# Patient Record
Sex: MALE | Race: WHITE | NOT HISPANIC OR LATINO | Employment: FULL TIME | URBAN - METROPOLITAN AREA
[De-identification: names, ages, dates, MRNs, and addresses within clinical notes are randomized per-mention and may not be internally consistent; named-entity substitution may affect disease eponyms.]

---

## 2017-03-08 DIAGNOSIS — E11.65 TYPE 2 DIABETES MELLITUS WITH HYPERGLYCEMIA (H): ICD-10-CM

## 2017-03-08 DIAGNOSIS — K21.9 ESOPHAGEAL REFLUX: ICD-10-CM

## 2017-03-08 DIAGNOSIS — R81 GLUCOSURIA: ICD-10-CM

## 2017-03-08 NOTE — TELEPHONE ENCOUNTER
omeprazole 20 MG tablet     Last Written Prescription Date: 8/18/2016  Last Fill Quantity: 90,  # refills:0  Last Office Visit with [facility], [facility] or [facility] prescribing provider: 2/16/2016                                               sertraline (ZOLOFT) 100 MG tablet   Last Written Prescription Date: 4/7/2015  Last Fill Quantity: 180, # refills: 3  Last Office Visit with [facility] primary care provider:  2/16/2016        Last PHQ-9 score on record=   PHQ-9 SCORE 11/25/2015   Total Score -   Total Score 4             metFORMIN (GLUCOPHAGE) 500 MG tablet     Last Written Prescription Date: 11/16/2015  Last Fill Quantity: 180, # refills: 3  Last Office Visit with [facility], [facility] or [facility] prescribing provider:  2/16/2016        BP Readings from Last 3 Encounters:   12/31/15 132/89   12/24/15 119/63   11/25/15 136/88     No results found for: MICROL  No results found for: MICROALBUMIN  Creatinine   Date Value Ref Range Status   12/23/2015 0.83 0.66 - 1.25 mg/dL Final   ]  GFR Estimate   Date Value Ref Range Status   12/23/2015 >90  Non  GFR Calc   >60 mL/min/1.7m2 Final   12/22/2015 >90  Non  GFR Calc   >60 mL/min/1.7m2 Final   11/16/2015 >90  Non  GFR Calc   >60 mL/min/1.7m2 Final     GFR Estimate If Black   Date Value Ref Range Status   12/23/2015 >90   GFR Calc   >60 mL/min/1.7m2 Final   12/22/2015 >90   GFR Calc   >60 mL/min/1.7m2 Final   11/16/2015 >90   GFR Calc   >60 mL/min/1.7m2 Final     Lab Results   Component Value Date    CHOL 141 11/16/2015     Lab Results   Component Value Date    HDL 40 11/16/2015     Lab Results   Component Value Date    LDL 69 11/16/2015     Lab Results   Component Value Date    TRIG 162 11/16/2015     Lab Results   Component Value Date    CHOLHDLRATIO 3.5 11/16/2015     Lab Results   Component Value Date    AST 53 12/22/2015     Lab Results   Component Value Date    ALT 80 12/22/2015     Lab Results    Component Value Date    A1C 7.0 12/22/2015    A1C 7.2 11/16/2015    A1C 15.0 04/07/2015     Potassium   Date Value Ref Range Status   12/23/2015 4.1 3.4 - 5.3 mmol/L Final

## 2017-03-08 NOTE — TELEPHONE ENCOUNTER
insulin glargine (LANTUS) 100 UNIT/ML PEN       Last Written Prescription Date: 11/16/2015  Last Fill Quantity: 1, # refills: 11  Last Office Visit with [facility] or [facility] prescribing provider:  2/16/2016        BP Readings from Last 3 Encounters:   12/31/15 132/89   12/24/15 119/63   11/25/15 136/88     No results found for: MICROL  No results found for: MICROALBUMIN  Creatinine   Date Value Ref Range Status   12/23/2015 0.83 0.66 - 1.25 mg/dL Final   ]  GFR Estimate   Date Value Ref Range Status   12/23/2015 >90  Non  GFR Calc   >60 mL/min/1.7m2 Final   12/22/2015 >90  Non  GFR Calc   >60 mL/min/1.7m2 Final   11/16/2015 >90  Non  GFR Calc   >60 mL/min/1.7m2 Final     GFR Estimate If Black   Date Value Ref Range Status   12/23/2015 >90   GFR Calc   >60 mL/min/1.7m2 Final   12/22/2015 >90   GFR Calc   >60 mL/min/1.7m2 Final   11/16/2015 >90   GFR Calc   >60 mL/min/1.7m2 Final     Lab Results   Component Value Date    CHOL 141 11/16/2015     Lab Results   Component Value Date    HDL 40 11/16/2015     Lab Results   Component Value Date    LDL 69 11/16/2015     Lab Results   Component Value Date    TRIG 162 11/16/2015     Lab Results   Component Value Date    CHOLHDLRATIO 3.5 11/16/2015     Lab Results   Component Value Date    AST 53 12/22/2015     Lab Results   Component Value Date    ALT 80 12/22/2015     Lab Results   Component Value Date    A1C 7.0 12/22/2015    A1C 7.2 11/16/2015    A1C 15.0 04/07/2015     Potassium   Date Value Ref Range Status   12/23/2015 4.1 3.4 - 5.3 mmol/L Final

## 2017-03-10 RX ORDER — NICOTINE POLACRILEX 4 MG/1
20 GUM, CHEWING ORAL DAILY
Qty: 90 TABLET | Refills: 0 | Status: CANCELLED | OUTPATIENT
Start: 2017-03-10

## 2017-03-10 RX ORDER — SERTRALINE HYDROCHLORIDE 100 MG/1
200 TABLET, FILM COATED ORAL DAILY
Qty: 180 TABLET | Refills: 3 | Status: CANCELLED | OUTPATIENT
Start: 2017-03-10

## 2017-03-10 NOTE — TELEPHONE ENCOUNTER
Mail box is full.   Unable to LM or reach pt.  Spoke with Pharmacist and he will inform pt he needs an appt.  Pt has Insurance now and will schedule.  Mimi

## 2017-06-02 ENCOUNTER — TELEPHONE (OUTPATIENT)
Dept: FAMILY MEDICINE | Facility: CLINIC | Age: 37
End: 2017-06-02

## 2017-06-02 DIAGNOSIS — E11.9 TYPE 2 DIABETES MELLITUS WITHOUT COMPLICATION, WITHOUT LONG-TERM CURRENT USE OF INSULIN (H): Primary | ICD-10-CM

## 2017-06-02 RX ORDER — INSULIN GLARGINE 100 [IU]/ML
65 INJECTION, SOLUTION SUBCUTANEOUS DAILY
Qty: 30 ML | Refills: 1 | Status: CANCELLED | OUTPATIENT
Start: 2017-06-02

## 2017-06-02 NOTE — TELEPHONE ENCOUNTER
He has not been into this clinic for more than 18 months. He needs to have a clinic visit to get his medications reconciled or else whichever physician is managing his diabetes needs to handle this order.    Solomon

## 2017-06-02 NOTE — TELEPHONE ENCOUNTER
Per Rosy Celeste, Insurance prefers Basaglar for Audit Purposes, please change Lantus order to Basaglar

## 2017-06-08 ENCOUNTER — TELEPHONE (OUTPATIENT)
Dept: FAMILY MEDICINE | Facility: CLINIC | Age: 37
End: 2017-06-08

## 2017-06-08 DIAGNOSIS — E11.65 TYPE 2 DIABETES MELLITUS WITH HYPERGLYCEMIA, WITHOUT LONG-TERM CURRENT USE OF INSULIN (H): ICD-10-CM

## 2017-06-08 NOTE — TELEPHONE ENCOUNTER
Reason for Call:  Other prescription    Detailed comments: Per fax from [facility] drug:  Pt's insurance wants to use Basaglar - They are asking for a new Rx for this med.        Call taken on 6/8/2017 at 10:59 AM by Beata Jimenez

## 2017-06-09 RX ORDER — INSULIN GLARGINE 100 [IU]/ML
65 INJECTION, SOLUTION SUBCUTANEOUS 2 TIMES DAILY
Qty: 3 ML | Refills: 11 | Status: SHIPPED | OUTPATIENT
Start: 2017-06-09 | End: 2017-06-12

## 2017-06-12 ENCOUNTER — OFFICE VISIT (OUTPATIENT)
Dept: FAMILY MEDICINE | Facility: CLINIC | Age: 37
End: 2017-06-12
Payer: COMMERCIAL

## 2017-06-12 VITALS
DIASTOLIC BLOOD PRESSURE: 91 MMHG | HEART RATE: 107 BPM | SYSTOLIC BLOOD PRESSURE: 131 MMHG | TEMPERATURE: 97.2 F | BODY MASS INDEX: 39.17 KG/M2 | WEIGHT: 315 LBS | HEIGHT: 75 IN

## 2017-06-12 DIAGNOSIS — E11.65 TYPE 2 DIABETES MELLITUS WITH HYPERGLYCEMIA, WITHOUT LONG-TERM CURRENT USE OF INSULIN (H): ICD-10-CM

## 2017-06-12 DIAGNOSIS — R81 GLUCOSURIA: ICD-10-CM

## 2017-06-12 DIAGNOSIS — K21.9 GASTROESOPHAGEAL REFLUX DISEASE, ESOPHAGITIS PRESENCE NOT SPECIFIED: ICD-10-CM

## 2017-06-12 LAB
ANION GAP SERPL CALCULATED.3IONS-SCNC: 8 MMOL/L (ref 3–14)
BUN SERPL-MCNC: 17 MG/DL (ref 7–30)
CALCIUM SERPL-MCNC: 9.4 MG/DL (ref 8.5–10.1)
CHLORIDE SERPL-SCNC: 103 MMOL/L (ref 94–109)
CO2 SERPL-SCNC: 23 MMOL/L (ref 20–32)
CREAT SERPL-MCNC: 0.78 MG/DL (ref 0.66–1.25)
GFR SERPL CREATININE-BSD FRML MDRD: ABNORMAL ML/MIN/1.7M2
GLUCOSE SERPL-MCNC: 273 MG/DL (ref 70–99)
HBA1C MFR BLD: 11.7 % (ref 4.3–6)
POTASSIUM SERPL-SCNC: 3.8 MMOL/L (ref 3.4–5.3)
SODIUM SERPL-SCNC: 134 MMOL/L (ref 133–144)

## 2017-06-12 PROCEDURE — 80048 BASIC METABOLIC PNL TOTAL CA: CPT | Performed by: FAMILY MEDICINE

## 2017-06-12 PROCEDURE — 83036 HEMOGLOBIN GLYCOSYLATED A1C: CPT | Performed by: FAMILY MEDICINE

## 2017-06-12 PROCEDURE — 36415 COLL VENOUS BLD VENIPUNCTURE: CPT | Performed by: FAMILY MEDICINE

## 2017-06-12 PROCEDURE — 99214 OFFICE O/P EST MOD 30 MIN: CPT | Performed by: FAMILY MEDICINE

## 2017-06-12 RX ORDER — PANTOPRAZOLE SODIUM 40 MG/1
40 TABLET, DELAYED RELEASE ORAL DAILY
Qty: 90 TABLET | Refills: 3 | Status: SHIPPED | OUTPATIENT
Start: 2017-06-12 | End: 2018-06-27

## 2017-06-12 RX ORDER — INSULIN GLARGINE 100 [IU]/ML
65 INJECTION, SOLUTION SUBCUTANEOUS 2 TIMES DAILY
Qty: 3 ML | Refills: 11 | Status: SHIPPED | OUTPATIENT
Start: 2017-06-12 | End: 2018-06-18

## 2017-06-12 ASSESSMENT — ANXIETY QUESTIONNAIRES
GAD7 TOTAL SCORE: 12
IF YOU CHECKED OFF ANY PROBLEMS ON THIS QUESTIONNAIRE, HOW DIFFICULT HAVE THESE PROBLEMS MADE IT FOR YOU TO DO YOUR WORK, TAKE CARE OF THINGS AT HOME, OR GET ALONG WITH OTHER PEOPLE: SOMEWHAT DIFFICULT
2. NOT BEING ABLE TO STOP OR CONTROL WORRYING: MORE THAN HALF THE DAYS
7. FEELING AFRAID AS IF SOMETHING AWFUL MIGHT HAPPEN: NOT AT ALL
1. FEELING NERVOUS, ANXIOUS, OR ON EDGE: MORE THAN HALF THE DAYS
3. WORRYING TOO MUCH ABOUT DIFFERENT THINGS: MORE THAN HALF THE DAYS
5. BEING SO RESTLESS THAT IT IS HARD TO SIT STILL: MORE THAN HALF THE DAYS
6. BECOMING EASILY ANNOYED OR IRRITABLE: MORE THAN HALF THE DAYS

## 2017-06-12 ASSESSMENT — PATIENT HEALTH QUESTIONNAIRE - PHQ9: 5. POOR APPETITE OR OVEREATING: MORE THAN HALF THE DAYS

## 2017-06-12 NOTE — PROGRESS NOTES
SUBJECTIVE:                                                    Ghanshyam Suggs is a 37 year old male who presents to clinic today for the following health issues:      Diabetes Follow-up      Patient is checking blood sugars: 1 time a week    Diabetic concerns: None and blood sugar frequently over 200     Symptoms of hypoglycemia (low blood sugar): dizzy, weak     Paresthesias (numbness or burning in feet) or sores: No     Date of last diabetic eye exam: 9 months ago       Amount of exercise or physical activity: physical job    Problems taking medications regularly: No    Medication side effects: no    Diet: diabetic          Problem list and histories reviewed & adjusted, as indicated.  Additional history:         Reviewed and updated as needed this visit by clinical staff  Allergies       Reviewed and updated as needed this visit by Provider      Further history obtained, clarified or corrected by physician:  His diabetes is uncontrolled because he has been off medication for months and now he has new insurance so he wants to get back on. He still has reflux regularly and it does not seem well controlled so he wants to try different medication.    OBJECTIVE:  LUNGS: clear to auscultation, normal breath sounds  CV: RRR without murmur  ABD: BS+, soft, nontender, no masses, no hepatosplenomegaly  EXTREMITIES: without joint tenderness, swelling or erythema.  No muscle tenderness or abnormality.  SKIN: No rashes or abnormalities  NEURO:non focal exam    ASSESSMENT:     Glucosuria  Gastroesophageal reflux disease, esophagitis presence not specified  Type 2 diabetes mellitus with hyperglycemia, without long-term current use of insulin (H)    PLAN:  Restart his diabetes medications  Diabetic educator referral  Switch to Protonix  Recheck in 3 months    Orders Placed This Encounter     Hemoglobin A1c     Basic metabolic panel     DIABETES EDUCATOR REFERRAL     metFORMIN (GLUCOPHAGE) 500 MG tablet     pantoprazole  (PROTONIX) 40 MG EC tablet     insulin glargine (BASAGLAR KWIKPEN) 100 UNIT/ML injection

## 2017-06-12 NOTE — MR AVS SNAPSHOT
After Visit Summary   6/12/2017    Ghanshyam Suggs    MRN: [id number]           Patient Information     Date Of Birth          1980        Visit Information        Provider Department      6/12/2017 2:40 PM Baltazar Carbajal MD [facility] [place]        Today's Diagnoses     Glucosuria        Gastroesophageal reflux disease, esophagitis presence not specified        Type 2 diabetes mellitus with hyperglycemia, without long-term current use of insulin (H)          Care Instructions    Thank you for choosing [facility].  You may be receiving a survey in the mail from [facility] regarding your visit today.  Please take a few minutes to complete and return the survey to let us know how we are doing.  Our Clinic hours are:  Mondays    7:20 am - 7 pm  Tues -  Fri  7:20 am - 5 pm  Clinic Phone: [phone]  The clinic lab opens at 7:30 am Mon - Fri and appointments are required.  [facility]. [phone]  Monday-Thursday 8 am - 7pm  Tues/Wed/Fri 8 am - 5:30 pm            Follow-ups after your visit        Additional Services     DIABETES EDUCATOR REFERRAL       DIABETES SELF MANAGEMENT TRAINING (DSMT)      Your provider has referred you to Diabetes Education: [facility]: Diabetes Education - All [facility] [phone]   [email or web address]    Type of training and number of hours: Previous Diagnosis: Follow-up DSMT - 2 hours.    Medicare covers: 10 hours of initial DSMT in 12 month period from the time of first visit, plus 2 hours of follow-up DSMT annually, and additional hours as requested for insulin training.    Diabetes Type: Type 2 - On Insulin             Diabetes Co-Morbidities:                A1C Goal:         A1C is: Lab Results       Component                Value               Date                       A1C                      7.0                 12/22/2015              If an urgent visit is needed or  A1C is above 12, Care Team to call the Diabetes Education Team at [phone] or send an In Basket message to the Diabetes Education Pool (P DIAB ED-PATIENT CARE).    Diabetes Education Topics:     Special Educational Needs Requiring Individual DSMT:        MEDICAL NUTRITION THERAPY (MNT) for Diabetes    Medical Nutrition Therapy with a Registered Dietitian can be provided in coordination with Diabetes Self-Management Training to assist in achieving optimal diabetes management.    MNT Type and Hours:                        Medicare will cover: 3 hours initial MNT in 12 month period after first visit, plus 2 hours of follow-up MNT annually    Please be aware that coverage of these services is subject to the terms and limitations of your health insurance plan.  Call member services at your health plan to determine Diabetes Self-Management Training benefits and ask which blood glucose monitor brands are covered by your plan.      Please bring the following with you to your appointment:    (1)  List of current medications   (2)  List of Blood Glucose Monitor brands that are covered by your insurance plan  (3)  Blood Glucose Monitor and log book  (4)   Food records for the 3 days prior to your visit    The Certified Diabetes Educator may make diabetes medication adjustments per the CDE Protocol and Collaborative Practice Agreement.                  Your next 10 appointments already scheduled     Jun 21, 2017  1:00 PM CDT   Diabetic Education with CL DIABETIC ED RESOURCE   [facility][note 3] ([facility][note 3])    [address]  [facility] [address] [phone]              Who to contact     If you have questions or need follow up information about today's clinic visit or your schedule please contact [facility] directly at [phone].  Normal or non-critical lab and imaging results will be communicated to you by MyChart, letter or phone within 4 business days  "after the clinic has received the results. If you do not hear from us within 7 days, please contact the clinic through [facility] or phone. If you have a critical or abnormal lab result, we will notify you by phone as soon as possible.  Submit refill requests through [employer] [facility] or call your pharmacy and they will forward the refill request to us. Please allow 3 business days for your refill to be completed.          Additional Information About Your Visit        [employer] [facility] Information     [facility] lets you send messages to your doctor, view your test results, renew your prescriptions, schedule appointments and more. To sign up, go to [email or web address] . Click on \"Log in\" on the left side of the screen, which will take you to the Welcome page. Then click on \"Sign up Now\" on the right side of the page.     You will be asked to enter the access code listed below, as well as some personal information. Please follow the directions to create your username and password.     Your access code is: [email or web address]  Expires: 9/10/2017  3:46 PM     Your access code will  in 90 days. If you need help or a new code, please call your [place] clinic or [phone].        Care EveryWhere ID     This is your Care EveryWhere ID. This could be used by other organizations to access your [place] medical records  [id number]        Your Vitals Were     Pulse Temperature Height BMI (Body Mass Index)          107 97.2  F (36.2  C) (Tympanic) 6' 2.5\" (1.892 m) 42.39 kg/m2         Blood Pressure from Last 3 Encounters:   17 (!) 131/91   12/31/15 132/89   12/24/15 119/63    Weight from Last 3 Encounters:   17 (!) 334 lb 9.6 oz (151.8 kg)   12/31/15 (!) 400 lb (181.4 kg)   12/22/15 (!) 428 lb 12.7 oz (194.5 kg)              We Performed the Following     Basic metabolic panel     DIABETES EDUCATOR REFERRAL     Hemoglobin A1c          Today's Medication Changes          These changes are accurate as of: 17  4:11 PM.  If you " have any questions, ask your nurse or doctor.               Start taking these medicines.        Dose/Directions    pantoprazole 40 MG EC tablet   Commonly known as:  PROTONIX   Used for:  Gastroesophageal reflux disease, esophagitis presence not specified   Replaces:  omeprazole 20 MG tablet        Dose:  40 mg   Take 1 tablet (40 mg) by mouth daily Take 30-60 minutes before a meal.   Quantity:  90 tablet   Refills:  3         Stop taking these medicines if you haven't already. Please contact your care team if you have questions.     omeprazole 20 MG tablet   Replaced by:  pantoprazole 40 MG EC tablet                Where to get your medicines      These medications were sent to [address]  [address] [facility] [address]     Phone:  [phone]     insulin glargine 100 UNIT/ML injection    metFORMIN 500 MG tablet    pantoprazole 40 MG EC tablet                Primary Care Provider Office Phone # Fax #    Baltazar Carbajal [phone]       [facility] [address] [facility] [address]        Thank you!     Thank you for choosing [facility]  for your care. Our goal is always to provide you with excellent care. Hearing back from our patients is one way we can continue to improve our services. Please take a few minutes to complete the written survey that you may receive in the mail after your visit with us. Thank you!             Your Updated Medication List - Protect others around you: Learn how to safely use, store and throw away your medicines at www.disposemymeds.org.          This list is accurate as of: 6/12/17  4:11 PM.  Always use your most recent med list.                   Brand Name Dispense Instructions for use    BD ULTRA FINE PEN NEEDLES     1 Box    1 device daily or as directed       blood glucose monitoring lancets     1 Box    Use to test blood sugars 4 times daily or as directed.       blood glucose  monitoring test strip    no brand specified    1 Box    Use to test blood sugar 4 times daily or as directed.       cephALEXin 500 MG capsule    KEFLEX    30 capsule    Take 1 capsule (500 mg) by mouth 3 times daily       * insulin glargine 100 UNIT/ML injection    LANTUS    60 mL    65 units daily (or as directed by providers) in the morning.       * insulin glargine 100 UNIT/ML injection     3 mL    Inject 65 Units Subcutaneous 2 times daily       meclizine 25 MG tablet    ANTIVERT    20 tablet    Take 1 tablet (25 mg) by mouth 3 times daily as needed for dizziness       metFORMIN 500 MG tablet    GLUCOPHAGE    180 tablet    Take 1 tablet (500 mg) by mouth 2 times daily (with meals)       naproxen 500 MG tablet    NAPROSYN    20 tablet    Take 1 tablet (500 mg) by mouth 2 times daily as needed for moderate pain       order for DME      Auto-CPAP: Max 15 cm H2O Min 5 cm H2O  Continuous  Lifetime need and heated humidity.       * oxyCODONE 5 MG IR tablet    ROXICODONE    1 tablet    Take 15 mg by mouth 2 times daily as needed for pain (up to 6 tabs per day as needed)       * oxyCODONE 5 MG IR tablet    ROXICODONE    30 tablet    Take 1 tablet (5 mg) by mouth every 4 hours as needed for moderate to severe pain       pantoprazole 40 MG EC tablet    PROTONIX    90 tablet    Take 1 tablet (40 mg) by mouth daily Take 30-60 minutes before a meal.       sertraline 100 MG tablet    ZOLOFT    180 tablet    Take 2 tablets (200 mg) by mouth daily       XANAX PO      Take 0.25 mg by mouth Takes 4 tabs as needed.       * Notice:  This list has 4 medication(s) that are the same as other medications prescribed for you. Read the directions carefully, and ask your doctor or other care provider to review them with you.

## 2017-06-12 NOTE — PATIENT INSTRUCTIONS
Thank you for choosing [facility].  You may be receiving a survey in the mail from [facility] regarding your visit today.  Please take a few minutes to complete and return the survey to let us know how we are doing.  Our Clinic hours are:  Mondays    7:20 am - 7 pm  Tues -  Fri  7:20 am - 5 pm  Clinic Phone: [phone]  The clinic lab opens at 7:30 am Mon - Fri and appointments are required.  [facility]. [phone]  Monday-Thursday 8 am - 7pm  Tues/Wed/Fri 8 am - 5:30 pm

## 2017-06-12 NOTE — NURSING NOTE
"Chief Complaint   Patient presents with     Diabetes       Initial BP (!) 131/91 (BP Location: Left arm, Patient Position: Chair, Cuff Size: Adult Large)  Pulse 107  Temp 97.2  F (36.2  C) (Tympanic)  Ht 6' 2.5\" (1.892 m)  Wt (!) 334 lb 9.6 oz (151.8 kg)  BMI 42.39 kg/m2 Estimated body mass index is 42.39 kg/(m^2) as calculated from the following:    Height as of this encounter: 6' 2.5\" (1.892 m).    Weight as of this encounter: 334 lb 9.6 oz (151.8 kg).  Medication Reconciliation: complete    "

## 2017-06-13 ASSESSMENT — ANXIETY QUESTIONNAIRES: GAD7 TOTAL SCORE: 12

## 2017-06-13 ASSESSMENT — PATIENT HEALTH QUESTIONNAIRE - PHQ9: SUM OF ALL RESPONSES TO PHQ QUESTIONS 1-9: 10

## 2017-06-14 DIAGNOSIS — E11.9 TYPE 2 DIABETES MELLITUS WITH TARGET HEMOGLOBIN A1C OF LESS THAN 8.0 PERCENT (H): ICD-10-CM

## 2017-06-14 DIAGNOSIS — E11.65 TYPE 2 DIABETES MELLITUS WITH HYPERGLYCEMIA (H): ICD-10-CM

## 2017-06-14 NOTE — TELEPHONE ENCOUNTER
Test strips      Last Written Prescription Date: 01/21/2016  Last Fill Quantity: 1,  # refills: 3   Last Office Visit with [facility] or [facility] prescribing provider: 06/12/2017    Pen needles      Last Written Prescription Date: 06/25/2015  Last Fill Quantity: 1,  # refills: 3   Last Office Visit with [facility] or [facility] prescribing provider: 06/12/2017                                               Gavin SHERIFF)

## 2017-06-21 ENCOUNTER — TELEPHONE (OUTPATIENT)
Dept: EDUCATION SERVICES | Facility: CLINIC | Age: 37
End: 2017-06-21

## 2017-06-21 DIAGNOSIS — Z53.9 NO SHOW: Primary | ICD-10-CM

## 2017-07-27 ENCOUNTER — TRANSFERRED RECORDS (OUTPATIENT)
Dept: HEALTH INFORMATION MANAGEMENT | Facility: CLINIC | Age: 37
End: 2017-07-27

## 2017-10-10 ENCOUNTER — TRANSFERRED RECORDS (OUTPATIENT)
Dept: HEALTH INFORMATION MANAGEMENT | Facility: CLINIC | Age: 37
End: 2017-10-10

## 2017-10-24 ENCOUNTER — APPOINTMENT (OUTPATIENT)
Dept: CT IMAGING | Facility: CLINIC | Age: 37
End: 2017-10-24
Attending: FAMILY MEDICINE
Payer: COMMERCIAL

## 2017-10-24 ENCOUNTER — HOSPITAL ENCOUNTER (EMERGENCY)
Facility: CLINIC | Age: 37
Discharge: HOME | End: 2017-10-24
Attending: FAMILY MEDICINE | Admitting: FAMILY MEDICINE
Payer: COMMERCIAL

## 2017-10-24 VITALS
TEMPERATURE: 97.9 F | OXYGEN SATURATION: 93 % | RESPIRATION RATE: 20 BRPM | DIASTOLIC BLOOD PRESSURE: 88 MMHG | SYSTOLIC BLOOD PRESSURE: 130 MMHG

## 2017-10-24 DIAGNOSIS — K57.32 DIVERTICULITIS OF COLON: ICD-10-CM

## 2017-10-24 LAB
ALBUMIN SERPL-MCNC: 3.6 G/DL (ref 3.4–5)
ALBUMIN UR-MCNC: NEGATIVE MG/DL
ALP SERPL-CCNC: 97 U/L (ref 40–150)
ALT SERPL W P-5'-P-CCNC: 29 U/L (ref 0–70)
ANION GAP SERPL CALCULATED.3IONS-SCNC: 7 MMOL/L (ref 3–14)
APPEARANCE UR: CLEAR
AST SERPL W P-5'-P-CCNC: 20 U/L (ref 0–45)
BASOPHILS # BLD AUTO: 0 10E9/L (ref 0–0.2)
BASOPHILS NFR BLD AUTO: 0.3 %
BILIRUB SERPL-MCNC: 0.3 MG/DL (ref 0.2–1.3)
BILIRUB UR QL STRIP: NEGATIVE
BUN SERPL-MCNC: 11 MG/DL (ref 7–30)
CALCIUM SERPL-MCNC: 8.8 MG/DL (ref 8.5–10.1)
CHLORIDE SERPL-SCNC: 109 MMOL/L (ref 94–109)
CO2 SERPL-SCNC: 23 MMOL/L (ref 20–32)
COLOR UR AUTO: YELLOW
CREAT SERPL-MCNC: 0.89 MG/DL (ref 0.66–1.25)
DIFFERENTIAL METHOD BLD: NORMAL
EOSINOPHIL # BLD AUTO: 0.2 10E9/L (ref 0–0.7)
EOSINOPHIL NFR BLD AUTO: 2.1 %
ERYTHROCYTE [DISTWIDTH] IN BLOOD BY AUTOMATED COUNT: 13.3 % (ref 10–15)
GFR SERPL CREATININE-BSD FRML MDRD: >90 ML/MIN/1.7M2
GLUCOSE SERPL-MCNC: 121 MG/DL (ref 70–99)
GLUCOSE UR STRIP-MCNC: NEGATIVE MG/DL
HCT VFR BLD AUTO: 44.2 % (ref 40–53)
HGB BLD-MCNC: 16.1 G/DL (ref 13.3–17.7)
HGB UR QL STRIP: NEGATIVE
IMM GRANULOCYTES # BLD: 0 10E9/L (ref 0–0.4)
IMM GRANULOCYTES NFR BLD: 0.3 %
KETONES UR STRIP-MCNC: NEGATIVE MG/DL
LEUKOCYTE ESTERASE UR QL STRIP: NEGATIVE
LIPASE SERPL-CCNC: 76 U/L (ref 73–393)
LYMPHOCYTES # BLD AUTO: 2.8 10E9/L (ref 0.8–5.3)
LYMPHOCYTES NFR BLD AUTO: 30.2 %
MCH RBC QN AUTO: 29.5 PG (ref 26.5–33)
MCHC RBC AUTO-ENTMCNC: 36.4 G/DL (ref 31.5–36.5)
MCV RBC AUTO: 81 FL (ref 78–100)
MONOCYTES # BLD AUTO: 0.5 10E9/L (ref 0–1.3)
MONOCYTES NFR BLD AUTO: 5.8 %
NEUTROPHILS # BLD AUTO: 5.7 10E9/L (ref 1.6–8.3)
NEUTROPHILS NFR BLD AUTO: 61.3 %
NITRATE UR QL: NEGATIVE
PH UR STRIP: 5.5 PH (ref 5–7)
PLATELET # BLD AUTO: 207 10E9/L (ref 150–450)
POTASSIUM SERPL-SCNC: 3.5 MMOL/L (ref 3.4–5.3)
PROT SERPL-MCNC: 7.2 G/DL (ref 6.8–8.8)
RBC # BLD AUTO: 5.45 10E12/L (ref 4.4–5.9)
SODIUM SERPL-SCNC: 139 MMOL/L (ref 133–144)
SOURCE: NORMAL
SP GR UR STRIP: 1.02 (ref 1–1.03)
UROBILINOGEN UR STRIP-MCNC: 2 MG/DL (ref 0–2)
WBC # BLD AUTO: 9.3 10E9/L (ref 4–11)

## 2017-10-24 PROCEDURE — 74177 CT ABD & PELVIS W/CONTRAST: CPT

## 2017-10-24 PROCEDURE — 81003 URINALYSIS AUTO W/O SCOPE: CPT | Performed by: FAMILY MEDICINE

## 2017-10-24 PROCEDURE — 83690 ASSAY OF LIPASE: CPT | Performed by: FAMILY MEDICINE

## 2017-10-24 PROCEDURE — 80053 COMPREHEN METABOLIC PANEL: CPT | Performed by: FAMILY MEDICINE

## 2017-10-24 PROCEDURE — 25000128 H RX IP 250 OP 636: Performed by: FAMILY MEDICINE

## 2017-10-24 PROCEDURE — 96376 TX/PRO/DX INJ SAME DRUG ADON: CPT | Performed by: FAMILY MEDICINE

## 2017-10-24 PROCEDURE — 96374 THER/PROPH/DIAG INJ IV PUSH: CPT | Mod: 59 | Performed by: FAMILY MEDICINE

## 2017-10-24 PROCEDURE — 96361 HYDRATE IV INFUSION ADD-ON: CPT | Performed by: FAMILY MEDICINE

## 2017-10-24 PROCEDURE — 96375 TX/PRO/DX INJ NEW DRUG ADDON: CPT | Performed by: FAMILY MEDICINE

## 2017-10-24 PROCEDURE — 99285 EMERGENCY DEPT VISIT HI MDM: CPT | Mod: Z6 | Performed by: FAMILY MEDICINE

## 2017-10-24 PROCEDURE — 85025 COMPLETE CBC W/AUTO DIFF WBC: CPT | Performed by: FAMILY MEDICINE

## 2017-10-24 PROCEDURE — 99285 EMERGENCY DEPT VISIT HI MDM: CPT | Mod: 25 | Performed by: FAMILY MEDICINE

## 2017-10-24 RX ORDER — KETOROLAC TROMETHAMINE 30 MG/ML
15 INJECTION, SOLUTION INTRAMUSCULAR; INTRAVENOUS ONCE
Status: COMPLETED | OUTPATIENT
Start: 2017-10-24 | End: 2017-10-24

## 2017-10-24 RX ORDER — HYDROMORPHONE HYDROCHLORIDE 1 MG/ML
0.5 INJECTION, SOLUTION INTRAMUSCULAR; INTRAVENOUS; SUBCUTANEOUS
Status: DISCONTINUED | OUTPATIENT
Start: 2017-10-24 | End: 2017-10-24 | Stop reason: HOSPADM

## 2017-10-24 RX ORDER — ONDANSETRON 2 MG/ML
4 INJECTION INTRAMUSCULAR; INTRAVENOUS
Status: DISCONTINUED | OUTPATIENT
Start: 2017-10-24 | End: 2017-10-24 | Stop reason: HOSPADM

## 2017-10-24 RX ORDER — IOPAMIDOL 755 MG/ML
100 INJECTION, SOLUTION INTRAVASCULAR ONCE
Status: COMPLETED | OUTPATIENT
Start: 2017-10-24 | End: 2017-10-24

## 2017-10-24 RX ORDER — METRONIDAZOLE 500 MG/1
500 TABLET ORAL 3 TIMES DAILY
Qty: 21 TABLET | Refills: 0 | Status: SHIPPED | OUTPATIENT
Start: 2017-10-24 | End: 2017-10-31

## 2017-10-24 RX ORDER — HYDROMORPHONE HYDROCHLORIDE 2 MG/1
2-4 TABLET ORAL EVERY 6 HOURS PRN
Qty: 20 TABLET | Refills: 0 | Status: SHIPPED | OUTPATIENT
Start: 2017-10-24 | End: 2018-01-03

## 2017-10-24 RX ORDER — CIPROFLOXACIN 500 MG/1
500 TABLET, FILM COATED ORAL 2 TIMES DAILY
Qty: 14 TABLET | Refills: 0 | Status: SHIPPED | OUTPATIENT
Start: 2017-10-24 | End: 2017-10-31

## 2017-10-24 RX ADMIN — HYDROMORPHONE HYDROCHLORIDE 0.5 MG: 1 INJECTION, SOLUTION INTRAMUSCULAR; INTRAVENOUS; SUBCUTANEOUS at 15:51

## 2017-10-24 RX ADMIN — SODIUM CHLORIDE 1000 ML: 9 INJECTION, SOLUTION INTRAVENOUS at 15:49

## 2017-10-24 RX ADMIN — HYDROMORPHONE HYDROCHLORIDE 0.5 MG: 1 INJECTION, SOLUTION INTRAMUSCULAR; INTRAVENOUS; SUBCUTANEOUS at 17:20

## 2017-10-24 RX ADMIN — KETOROLAC TROMETHAMINE 15 MG: 30 INJECTION, SOLUTION INTRAMUSCULAR at 15:50

## 2017-10-24 RX ADMIN — IOPAMIDOL 100 ML: 755 INJECTION, SOLUTION INTRAVENOUS at 17:02

## 2017-10-24 RX ADMIN — ONDANSETRON 4 MG: 2 INJECTION INTRAMUSCULAR; INTRAVENOUS at 15:49

## 2017-10-24 NOTE — ED AVS SNAPSHOT
[facility]    [address] [facility] [address]    Phone:  [phone]    Fax:  [phone]                                       Ghanshyam Suggs   MRN: [id number]    Department:  [facility]   Date of Visit:  10/24/2017           After Visit Summary Signature Page     I have received my discharge instructions, and my questions have been answered. I have discussed any challenges I see with this plan with the nurse or doctor.    ..........................................................................................................................................  Patient/Patient Representative Signature      ..........................................................................................................................................  Patient Representative Print Name and Relationship to Patient    ..................................................               ................................................  Date                                            Time    ..........................................................................................................................................  Reviewed by Signature/Title    ...................................................              ..............................................  Date                                                            Time

## 2017-10-24 NOTE — ED NOTES
Pt reports mid lower abdominal pain for a couple of weeks with pain in testicle and into rectum that is consistent 7/10 and increases to 10/10.   Increased pain with coughing. Denies vomiting, difficulty with urination, rectal bleeding.

## 2017-10-24 NOTE — ED AVS SNAPSHOT
[facility]    [address] [facility] [address] [facility] [address]    Phone:  [phone]    Fax:  [phone]                                       Ghanshyam Suggs   MRN: [id number]    Department:  [facility]   Date of Visit:  10/24/2017           Patient Information     Date Of Birth          1980        Your diagnoses for this visit were:     Diverticulitis of colon        You were seen by Axel Fleming MD.        Discharge Instructions       Return to the Emergency Room if the following occurs:     Worsened pain, vomiting/dehydration, or for any concern at anytime.    Or, follow-up with the following provider as we discussed:     Return to your primary doctor next week for reevaluation.    Medications discussed:    Ibuprofen 600 mg every six hours for pain (7 days duration).  Tylenol 1000 mg every six hours for pain (7 days duration).  Therefore, you can alternate these every three hours and do it safely.  Dilaudid 2-4 mg every six hours as needed for pain not relieved by the above.  Ciprofloxacin.  Flagyl.  Probiotic daily.  Continue for at least a week beyond completion of the antibiotic.    If you received pain-relieving or sedating medication during your time in the ER, avoid alcohol, driving automobiles, or working with machinery.  Also, a responsible adult must stay with you.      If you had X-rays or labs done we will attempt to contact you if there is a change needed in your care.      Call the Nurse Advice Line at [phone] or [phone] for any concern at anytime.      24 Hour Appointment Hotline       To make an appointment at any [facility], call [phone] ([phone]). If you don't have a family doctor or clinic, we will help you find one. [facility] are conveniently located to serve the needs of you and your family.             Review of your medicines      START taking        Dose / Directions Last dose taken     ciprofloxacin 500 MG tablet   Commonly known as:  CIPRO   Dose:  500 mg   Quantity:  14 tablet        Take 1 tablet (500 mg) by mouth 2 times daily for 7 days   Refills:  0        HYDROmorphone 2 MG tablet   Commonly known as:  DILAUDID   Dose:  2-4 mg   Quantity:  20 tablet        Take 1-2 tablets (2-4 mg) by mouth every 6 hours as needed for moderate to severe pain   Refills:  0        metroNIDAZOLE 500 MG tablet   Commonly known as:  FLAGYL   Dose:  500 mg   Quantity:  21 tablet        Take 1 tablet (500 mg) by mouth 3 times daily for 7 days   Refills:  0          Our records show that you are taking the medicines listed below. If these are incorrect, please call your family doctor or clinic.        Dose / Directions Last dose taken    BD ULTRA FINE PEN NEEDLES   Quantity:  1 Box        1 device daily   Refills:  3        blood glucose monitoring lancets   Quantity:  1 Box        Use to test blood sugars 4 times daily or as directed.   Refills:  prn        blood glucose monitoring test strip   Commonly known as:  no brand specified   Quantity:  4 Box        Use to test blood sugar 4 times daily   Refills:  3        cephALEXin 500 MG capsule   Commonly known as:  KEFLEX   Dose:  500 mg   Quantity:  30 capsule        Take 1 capsule (500 mg) by mouth 3 times daily   Refills:  0        * insulin glargine 100 UNIT/ML injection   Commonly known as:  LANTUS   Quantity:  60 mL        65 units daily (or as directed by providers) in the morning.   Refills:  1        * BASAGLAR 100 UNIT/ML injection   Dose:  65 Units   Quantity:  3 mL        Inject 65 Units Subcutaneous 2 times daily   Refills:  11        meclizine 25 MG tablet   Commonly known as:  ANTIVERT   Dose:  25 mg   Quantity:  20 tablet        Take 1 tablet (25 mg) by mouth 3 times daily as needed for dizziness   Refills:  0        metFORMIN 500 MG tablet   Commonly known as:  GLUCOPHAGE   Dose:  500 mg   Quantity:  180 tablet        Take 1 tablet (500 mg) by mouth  2 times daily (with meals)   Refills:  3        naproxen 500 MG tablet   Commonly known as:  NAPROSYN   Dose:  500 mg   Quantity:  20 tablet        Take 1 tablet (500 mg) by mouth 2 times daily as needed for moderate pain   Refills:  1        order for DME        Auto-CPAP: Max 15 cm H2O Min 5 cm H2O  Continuous  Lifetime need and heated humidity.   Refills:  0        * oxyCODONE 5 MG IR tablet   Commonly known as:  ROXICODONE   Dose:  15 mg   Quantity:  1 tablet        Take 15 mg by mouth 2 times daily as needed for pain (up to 6 tabs per day as needed)   Refills:  0        * oxyCODONE 5 MG IR tablet   Commonly known as:  ROXICODONE   Dose:  5 mg   Quantity:  30 tablet        Take 1 tablet (5 mg) by mouth every 4 hours as needed for moderate to severe pain   Refills:  0        pantoprazole 40 MG EC tablet   Commonly known as:  PROTONIX   Dose:  40 mg   Quantity:  90 tablet        Take 1 tablet (40 mg) by mouth daily Take 30-60 minutes before a meal.   Refills:  3        sertraline 100 MG tablet   Commonly known as:  ZOLOFT   Dose:  200 mg   Quantity:  180 tablet        Take 2 tablets (200 mg) by mouth daily   Refills:  3        XANAX PO   Dose:  0.25 mg   Indication:  Feeling Anxious        Take 0.25 mg by mouth Takes 4 tabs as needed.   Refills:  0        * Notice:  This list has 4 medication(s) that are the same as other medications prescribed for you. Read the directions carefully, and ask your doctor or other care provider to review them with you.            Prescriptions were sent or printed at these locations (3 Prescriptions)                   Other Prescriptions                Printed at Department/Unit printer (3 of 3)         ciprofloxacin (CIPRO) 500 MG tablet               metroNIDAZOLE (FLAGYL) 500 MG tablet               HYDROmorphone (DILAUDID) 2 MG tablet                Procedures and tests performed during your visit     Abd/pelvis CT, IV contrast only TRAUMA  / AAA    CBC with platelets,  differential    Comprehensive metabolic panel    Lipase    UA reflex to Microscopic      Orders Needing Specimen Collection     None      Pending Results     Date and Time Order Name Status Description    10/24/2017 1603 Abd/pelvis CT, IV contrast only TRAUMA  / AAA Preliminary             Pending Culture Results     No orders found from 10/22/2017 to 10/25/2017.            Pending Results Instructions     If you had any lab results that were not finalized at the time of your Discharge, you can call the ED Lab Result RN at [phone]. You will be contacted by this team for any positive Lab results or changes in treatment. The nurses are available 7 days a week from 10A to 6:30P.  You can leave a message 24 hours per day and they will return your call.        Test Results From Your Hospital Stay        10/24/2017  3:33 PM      Component Results     Component Value Ref Range & Units Status    Color Urine Yellow  Final    Appearance Urine Clear  Final    Glucose Urine Negative NEG^Negative mg/dL Final    Bilirubin Urine Negative NEG^Negative Final    Ketones Urine Negative NEG^Negative mg/dL Final    Specific Gravity Urine 1.021 1.003 - 1.035 Final    Blood Urine Negative NEG^Negative Final    pH Urine 5.5 5.0 - 7.0 pH Final    Protein Albumin Urine Negative NEG^Negative mg/dL Final    Urobilinogen mg/dL 2.0 0.0 - 2.0 mg/dL Final    Nitrite Urine Negative NEG^Negative Final    Leukocyte Esterase Urine Negative NEG^Negative Final    Source Midstream Urine  Final         10/24/2017  4:03 PM      Component Results     Component Value Ref Range & Units Status    WBC 9.3 4.0 - 11.0 10e9/L Final    RBC Count 5.45 4.4 - 5.9 10e12/L Final    Hemoglobin 16.1 13.3 - 17.7 g/dL Final    Hematocrit 44.2 40.0 - 53.0 % Final    MCV 81 78 - 100 fl Final    MCH 29.5 26.5 - 33.0 pg Final    MCHC 36.4 31.5 - 36.5 g/dL Final    RDW 13.3 10.0 - 15.0 % Final    Platelet Count 207 150 - 450 10e9/L Final    Diff Method Automated Method   Final    % Neutrophils 61.3 % Final    % Lymphocytes 30.2 % Final    % Monocytes 5.8 % Final    % Eosinophils 2.1 % Final    % Basophils 0.3 % Final    % Immature Granulocytes 0.3 % Final    Absolute Neutrophil 5.7 1.6 - 8.3 10e9/L Final    Absolute Lymphocytes 2.8 0.8 - 5.3 10e9/L Final    Absolute Monocytes 0.5 0.0 - 1.3 10e9/L Final    Absolute Eosinophils 0.2 0.0 - 0.7 10e9/L Final    Absolute Basophils 0.0 0.0 - 0.2 10e9/L Final    Abs Immature Granulocytes 0.0 0 - 0.4 10e9/L Final         10/24/2017  4:12 PM      Component Results     Component Value Ref Range & Units Status    Sodium 139 133 - 144 mmol/L Final    Potassium 3.5 3.4 - 5.3 mmol/L Final    Chloride 109 94 - 109 mmol/L Final    Carbon Dioxide 23 20 - 32 mmol/L Final    Anion Gap 7 3 - 14 mmol/L Final    Glucose 121 (H) 70 - 99 mg/dL Final    Urea Nitrogen 11 7 - 30 mg/dL Final    Creatinine 0.89 0.66 - 1.25 mg/dL Final    GFR Estimate >90 >60 mL/min/1.7m2 Final    Non  GFR Calc    GFR Estimate If Black >90 >60 mL/min/1.7m2 Final    African American GFR Calc    Calcium 8.8 8.5 - 10.1 mg/dL Final    Bilirubin Total 0.3 0.2 - 1.3 mg/dL Final    Albumin 3.6 3.4 - 5.0 g/dL Final    Protein Total 7.2 6.8 - 8.8 g/dL Final    Alkaline Phosphatase 97 40 - 150 U/L Final    ALT 29 0 - 70 U/L Final    AST 20 0 - 45 U/L Final         10/24/2017  4:10 PM      Component Results     Component Value Ref Range & Units Status    Lipase 76 73 - 393 U/L Final         10/24/2017  5:26 PM      Narrative     CT ABDOMEN AND PELVIS WITH CONTRAST  10/24/2017 5:16 PM     HISTORY: Pelvic pain radiating toward the rectum.    COMPARISON: 12/22/2015    TECHNIQUE: Volumetric helical acquisition of CT images from the lung  bases through the symphysis pubis were acquired after administration  of 100 mL Isovue 370 intravenous contrast. Coronal images  reconstructed from axial image data. Radiation dose for this scan was  reduced using automated exposure control,  "adjustment of the mA and/or  kV according to patient size, or iterative reconstruction technique.    FINDINGS: There is inflammation around the sigmoid colon consistent  with acute diverticulitis. No evidence for intraperitoneal free air or  abscess formation. Mild diverticulosis. Trace free fluid. The liver,  spleen, adrenal glands, kidneys and pancreas demonstrate no worrisome  focal lesion. No hydronephrosis. Unremarkable gallbladder. Normal  caliber aorta. Lung bases demonstrate mild bibasilar atelectasis  and/or infiltrate, left worse than right. No pleural or pericardial  effusion. Bone windows reveal no suspicious lesions. No free air in  the abdomen. There are no abdominal or pelvic lymph nodes that are  abnormal by size criteria. There are no dilated loops of small  intestine or large bowel to suggest ileus or obstruction.        Impression     IMPRESSION: Acute sigmoid diverticulitis. No evidence for  intraperitoneal free air or abscess formation.                Thank you for choosing [facility]       Thank you for choosing [facility] for your care. Our goal is always to provide you with excellent care. Hearing back from our patients is one way we can continue to improve our services. Please take a few minutes to complete the written survey that you may receive in the mail after you visit with us. Thank you!        [employer] Information     [employer] lets you send messages to your doctor, view your test results, renew your prescriptions, schedule appointments and more. To sign up, go to [email or web address] . Click on \"Log in\" on the left side of the screen, which will take you to the Welcome page. Then click on \"Sign up Now\" on the right side of the page.     You will be asked to enter the access code listed below, as well as some personal information. Please follow the directions to create your username and password.     Your access code is: [email or web address]  Expires: 1/22/2018  5:35 PM     Your access code will "  in 90 days. If you need help or a new code, please call your [facility] clinic or [phone].        Care EveryWhere ID     This is your Care EveryWhere ID. This could be used by other organizations to access your [facility] medical records  [id number]        Equal Access to Services     IZA PAYTON : Leigh marcano Soyasmin, waaxda luqadaha, qaybta kaalmada john, manuel ty. So [facility] [phone].    ATENCIÓN: Si habla español, tiene a vazquez disposición servicios gratuitos de asistencia lingüística. Llame al [phone].    We comply with applicable federal civil rights laws and [place] laws. We do not discriminate on the basis of race, color, national origin, age, disability, sex, sexual orientation, or gender identity.            After Visit Summary       This is your record. Keep this with you and show to your community pharmacist(s) and doctor(s) at your next visit.

## 2017-10-24 NOTE — DISCHARGE INSTRUCTIONS
Return to the Emergency Room if the following occurs:     Worsened pain, vomiting/dehydration, or for any concern at anytime.    Or, follow-up with the following provider as we discussed:     Return to your primary doctor next week for reevaluation.    Medications discussed:    Ibuprofen 600 mg every six hours for pain (7 days duration).  Tylenol 1000 mg every six hours for pain (7 days duration).  Therefore, you can alternate these every three hours and do it safely.  Dilaudid 2-4 mg every six hours as needed for pain not relieved by the above.  Ciprofloxacin.  Flagyl.  Probiotic daily.  Continue for at least a week beyond completion of the antibiotic.    If you received pain-relieving or sedating medication during your time in the ER, avoid alcohol, driving automobiles, or working with machinery.  Also, a responsible adult must stay with you.      If you had X-rays or labs done we will attempt to contact you if there is a change needed in your care.      Call the Nurse Advice Line at [phone] or [phone] for any concern at anytime.

## 2017-10-24 NOTE — ED PROVIDER NOTES
HPI  Patient is a 37-year-old male presenting with lower pelvic pain that radiates towards his anus.  He does have a known history of diabetes.  He takes insulin and metformin.  Obstructive sleep apnea and asthma.  He quit smoking in 2014.  Rare alcohol, none recently.  No drug use.    The patient has had about 10 days of midline pelvic pain.  He rates the pain at 7/10 over the first seven days.  Over the past three days the pain has been more severe.  He has occasional radiating symptoms toward the anus.  This is also severe.  He has radiating symptoms toward his left testicle as well.  He denies testicular swelling or tenderness.  No mass or fullness involving the left testicle.  No dysuria, urgency, frequency, or hematuria.  No discharge from the penis.  He denies constipation and diarrhea.  No hematochezia or melena.  No purulence with passing stool.  He denies specific pain with passing stool.  He denies mass or fluctuance at the anus.  He has had chills since yesterday.  No trauma or injury.  No skin rash.    ROS: All other review of systems are negative other than that noted above.   PMH: Reviewed.  SH: Reviewed.  FH: Reviewed.      PHYSICAL  BP (!) 130/123  Temp 97.9  F (36.6  C) (Oral)  Resp 20  SpO2 93%  General: Patient is alert and in moderate distress.  Morbidly obese.  Neurological: Alert.  Moving upper and lower extremities equally, bilaterally.  Head / Neck: Atraumatic.  Ears: Not done.  Eyes: Pupils are equal, round, and reactive.  Normal conjunctiva.  Nose: Midline.  No epistaxis.  Mouth / Throat: No ulcerations or lesions.  Upper pharynx is not erythematous.  Moist.  Respiratory: No respiratory distress. CTA B.  Cardiovascular: Regular rhythm.  Peripheral extremities are warm.  No edema.  No calf tenderness.  Abdomen / Pelvis: Tender lower abdomen, especially along the midline and just to the left and right of midline.  No organomegaly or mass.  No distention.  Soft throughout.  Genitalia: Not  done.  Musculoskeletal: No tenderness over major muscles and joints.  Skin: No evidence of rash or trauma.        PHYSICIAN  1539.  Patient has pelvic pain that radiates toward the anus.  Urinalysis and blood pending.  Type of CT scan is dependent on those results.  Toradol and Dilaudid ordered.  Zofran.    Labs Ordered and Resulted from Time of ED Arrival Up to the Time of Departure from the ED   COMPREHENSIVE METABOLIC PANEL - Abnormal; Notable for the following:        Result Value    Glucose 121 (*)     All other components within normal limits   URINE MACROSCOPIC WITH REFLEX TO MICRO   CBC WITH PLATELETS DIFFERENTIAL   LIPASE     IMAGING  Images reviewed by me.  Radiology report also reviewed.  Abd/pelvis CT, IV contrast only TRAUMA  / AAA   Preliminary Result   IMPRESSION: Acute sigmoid diverticulitis. No evidence for   intraperitoneal free air or abscess formation.        1733.  CT imaging is suggesting sigmoid diverticulitis.  He has an allergy to penicillin so ciprofloxacin and Flagyl will be given.  Probiotic recommendations provided.  I also gave him a prescription for oxycodone, #20 tablets.  Follow up next week for reevaluation.  Return here for worsening as discussed.      IMPRESSION    ICD-10-CM    1. Diverticulitis of colon K57.32            Critical Care time:  none                    Axel Fleming MD  10/24/17 1731

## 2018-01-03 ENCOUNTER — RADIANT APPOINTMENT (OUTPATIENT)
Dept: GENERAL RADIOLOGY | Facility: CLINIC | Age: 38
End: 2018-01-03
Attending: FAMILY MEDICINE
Payer: COMMERCIAL

## 2018-01-03 ENCOUNTER — OFFICE VISIT (OUTPATIENT)
Dept: FAMILY MEDICINE | Facility: CLINIC | Age: 38
End: 2018-01-03
Payer: COMMERCIAL

## 2018-01-03 VITALS
SYSTOLIC BLOOD PRESSURE: 150 MMHG | RESPIRATION RATE: 20 BRPM | DIASTOLIC BLOOD PRESSURE: 95 MMHG | WEIGHT: 315 LBS | TEMPERATURE: 99.4 F | HEIGHT: 75 IN | BODY MASS INDEX: 39.17 KG/M2 | HEART RATE: 98 BPM | OXYGEN SATURATION: 90 %

## 2018-01-03 DIAGNOSIS — E11.9 TYPE 2 DIABETES MELLITUS WITHOUT COMPLICATION, WITH LONG-TERM CURRENT USE OF INSULIN (H): ICD-10-CM

## 2018-01-03 DIAGNOSIS — R68.89 INFLUENZA-LIKE SYMPTOMS: Primary | ICD-10-CM

## 2018-01-03 DIAGNOSIS — Z79.4 TYPE 2 DIABETES MELLITUS WITHOUT COMPLICATION, WITH LONG-TERM CURRENT USE OF INSULIN (H): ICD-10-CM

## 2018-01-03 LAB
BASOPHILS # BLD AUTO: 0.1 10E9/L (ref 0–0.2)
BASOPHILS NFR BLD AUTO: 0.5 %
DIFFERENTIAL METHOD BLD: NORMAL
EOSINOPHIL # BLD AUTO: 0.2 10E9/L (ref 0–0.7)
EOSINOPHIL NFR BLD AUTO: 2.3 %
ERYTHROCYTE [DISTWIDTH] IN BLOOD BY AUTOMATED COUNT: 13.3 % (ref 10–15)
FLUAV+FLUBV AG SPEC QL: NEGATIVE
FLUAV+FLUBV AG SPEC QL: NEGATIVE
HCT VFR BLD AUTO: 47.2 % (ref 40–53)
HGB BLD-MCNC: 16.8 G/DL (ref 13.3–17.7)
LYMPHOCYTES # BLD AUTO: 4 10E9/L (ref 0.8–5.3)
LYMPHOCYTES NFR BLD AUTO: 40.2 %
MCH RBC QN AUTO: 28.6 PG (ref 26.5–33)
MCHC RBC AUTO-ENTMCNC: 35.6 G/DL (ref 31.5–36.5)
MCV RBC AUTO: 80 FL (ref 78–100)
MONOCYTES # BLD AUTO: 0.4 10E9/L (ref 0–1.3)
MONOCYTES NFR BLD AUTO: 4.1 %
NEUTROPHILS # BLD AUTO: 5.2 10E9/L (ref 1.6–8.3)
NEUTROPHILS NFR BLD AUTO: 52.9 %
PLATELET # BLD AUTO: 234 10E9/L (ref 150–450)
RBC # BLD AUTO: 5.87 10E12/L (ref 4.4–5.9)
SPECIMEN SOURCE: NORMAL
WBC # BLD AUTO: 9.9 10E9/L (ref 4–11)

## 2018-01-03 PROCEDURE — 87804 INFLUENZA ASSAY W/OPTIC: CPT | Performed by: FAMILY MEDICINE

## 2018-01-03 PROCEDURE — 36415 COLL VENOUS BLD VENIPUNCTURE: CPT | Performed by: FAMILY MEDICINE

## 2018-01-03 PROCEDURE — 85025 COMPLETE CBC W/AUTO DIFF WBC: CPT | Performed by: FAMILY MEDICINE

## 2018-01-03 PROCEDURE — 99214 OFFICE O/P EST MOD 30 MIN: CPT | Performed by: FAMILY MEDICINE

## 2018-01-03 PROCEDURE — 71046 X-RAY EXAM CHEST 2 VIEWS: CPT | Mod: FY

## 2018-01-03 RX ORDER — CODEINE PHOSPHATE AND GUAIFENESIN 10; 100 MG/5ML; MG/5ML
1 SOLUTION ORAL EVERY 4 HOURS PRN
Qty: 120 ML | Refills: 0 | Status: SHIPPED | OUTPATIENT
Start: 2018-01-03 | End: 2018-12-18

## 2018-01-03 RX ORDER — ALBUTEROL SULFATE 90 UG/1
2 AEROSOL, METERED RESPIRATORY (INHALATION) 4 TIMES DAILY
Qty: 1 INHALER | Refills: 0 | Status: SHIPPED | OUTPATIENT
Start: 2018-01-03

## 2018-01-03 ASSESSMENT — PATIENT HEALTH QUESTIONNAIRE - PHQ9: SUM OF ALL RESPONSES TO PHQ QUESTIONS 1-9: 0

## 2018-01-03 NOTE — PROGRESS NOTES
"  SUBJECTIVE:   Ghanshyam Suggs is a 37 year old male who presents to clinic today for the following health issues:      Acute Illness   Acute illness concerns: productive cough with wheezy  Onset: 1.5-2 days    Fever: YES    Chills/Sweats: YES    Headache (location?): YES    Sinus Pressure:no    Conjunctivitis:  YES- behind eyes    Ear Pain: no    Rhinorrhea: YES    Congestion: YES    Sore Throat: YES     Cough: YES-productive of green sputum and bloody, with shortness of breath    Wheeze: YES    Decreased Appetite: YES    Nausea: YES    Vomiting: YES- from coughing    Diarrhea:  YES    Dysuria/Freq.: no     Fatigue/Achiness: YES    Sick/Strep Exposure: YES- kids     Therapies Tried and outcome: day quil    Patient is diabetic-= blood sugars running about 200.  He is taking his insulin and feels it's better controlled than usual.           BP (!) 150/95  Pulse 98  Temp 99.4  F (37.4  C) (Tympanic)  Resp 20  Ht 6' 2.5\" (1.892 m)  Wt (!) 376 lb (170.6 kg)  SpO2 90%  BMI 47.63 kg/m2  EXAM: GENERAL APPEARANCE: Alert, no acute distress  HENT: Ears and TMs normal, oral mucosa and posterior oropharynx normal  RESP: lungs clear to auscultation   CV: normal rate, regular rhythm, no murmur or gallop  ABDOMEN: soft, no organomegaly, masses or tenderness    Results for orders placed or performed in visit on 01/03/18   CBC with platelets differential   Result Value Ref Range    WBC 9.9 4.0 - 11.0 10e9/L    RBC Count 5.87 4.4 - 5.9 10e12/L    Hemoglobin 16.8 13.3 - 17.7 g/dL    Hematocrit 47.2 40.0 - 53.0 %    MCV 80 78 - 100 fl    MCH 28.6 26.5 - 33.0 pg    MCHC 35.6 31.5 - 36.5 g/dL    RDW 13.3 10.0 - 15.0 %    Platelet Count 234 150 - 450 10e9/L    Diff Method Automated Method     % Neutrophils 52.9 %    % Lymphocytes 40.2 %    % Monocytes 4.1 %    % Eosinophils 2.3 %    % Basophils 0.5 %    Absolute Neutrophil 5.2 1.6 - 8.3 10e9/L    Absolute Lymphocytes 4.0 0.8 - 5.3 10e9/L    Absolute Monocytes 0.4 0.0 - 1.3 10e9/L "    Absolute Eosinophils 0.2 0.0 - 0.7 10e9/L    Absolute Basophils 0.1 0.0 - 0.2 10e9/L   Influenza A/B antigen   Result Value Ref Range    Influenza A/B Agn Specimen B*     Influenza A Negative NEG^Negative    Influenza B Negative NEG^Negative     Chest x ray: negative for infiltrate    ASSESSMENT/PLAN:      ICD-10-CM    1. Influenza-like symptoms R68.89 XR Chest 2 Views     CBC with platelets differential     Influenza A/B antigen     guaiFENesin-codeine (ROBITUSSIN AC) 100-10 MG/5ML SOLN solution     albuterol (PROAIR HFA/PROVENTIL HFA/VENTOLIN HFA) 108 (90 BASE) MCG/ACT Inhaler   2. Type 2 diabetes mellitus without complication, with long-term current use of insulin (H) E11.9     Z79.4      There is no evidence of serious disease and some indications of viral etiology.  Will follow expectantly for now.  Recheck if not improving as expected Treat fever for comfort.  Recheck if fever persisting over the next 3 days.  Also asked to follow up if marked change in his breathing.  Avoid smoking.   Recommend albuterol four times daily until cough is improved.    At this time no indication for antibiotics.    Reminded that he is due for diabetes visit with Dr. Solomon Dobson M.D.      Patient Instructions         Thank you for choosing [facility].  You may be receiving a survey in the mail from [facility] regarding your visit today.  Please take a few minutes to complete and return the survey to let us know how we are doing.      Our Clinic hours are:  Mondays    7:20 am - 7 pm  Tues -  Fri  7:20 am - 5 pm    Clinic Phone: [phone]    The clinic lab opens at 7:30 am Mon - Fri and appointments are required.    [facility]. [phone]  Monday-Thursday 8 am - 7pm  Tues/Wed/Fri 8 am - 5:30 pm

## 2018-01-03 NOTE — MR AVS SNAPSHOT
After Visit Summary   1/3/2018    Ghanshyam Suggs    MRN: [id number]           Patient Information     Date Of Birth          1980        Visit Information        Provider Department      1/3/2018 2:40 PM Nereida Dobson MD [facility]        Today's Diagnoses     Influenza-like symptoms    -  1    Type 2 diabetes mellitus without complication, with long-term current use of insulin (H)          Care Instructions          Thank you for choosing [facility].  You may be receiving a survey in the mail from [facility] regarding your visit today.  Please take a few minutes to complete and return the survey to let us know how we are doing.      Our Clinic hours are:  Mondays    7:20 am - 7 pm  Tues -  Fri  7:20 am - 5 pm    Clinic Phone: [phone]    The clinic lab opens at 7:30 am Mon - Fri and appointments are required.    [facility] [place]  Ph. [phone]  Monday-Thursday 8 am - 7pm  Tues/Wed/Fri 8 am - 5:30 pm                 Follow-ups after your visit        Who to contact     If you have questions or need follow up information about today's clinic visit or your schedule please contact [facility] directly at [phone].  Normal or non-critical lab and imaging results will be communicated to you by MyChart, letter or phone within 4 business days after the clinic has received the results. If you do not hear from us within 7 days, please contact the clinic through [employer] or phone. If you have a critical or abnormal lab result, we will notify you by phone as soon as possible.  Submit refill requests through [employer] or call your pharmacy and they will forward the refill request to us. Please allow 3 business days for your refill to be completed.          Additional Information About Your Visit        [employer] Information     [employer] lets you send messages to your doctor, view your test results, renew your prescriptions, schedule  "appointments and more. To sign up, go to [email or web address] . Click on \"Log in\" on the left side of the screen, which will take you to the Welcome page. Then click on \"Sign up Now\" on the right side of the page.     You will be asked to enter the access code listed below, as well as some personal information. Please follow the directions to create your username and password.     Your access code is: [email or web address]  Expires: 2018  4:35 PM     Your access code will  in 90 days. If you need help or a new code, please call your [facility] clinic or [phone].        Care EveryWhere ID     This is your Care EveryWhere ID. This could be used by other organizations to access your [facility] medical records  [id number]        Your Vitals Were     Pulse Temperature Respirations Height Pulse Oximetry BMI (Body Mass Index)    98 99.4  F (37.4  C) (Tympanic) 20 6' 2.5\" (1.892 m) 90% 47.63 kg/m2       Blood Pressure from Last 3 Encounters:   18 (!) 150/95   10/24/17 130/88   17 (!) 131/91    Weight from Last 3 Encounters:   18 (!) 376 lb (170.6 kg)   17 (!) 334 lb 9.6 oz (151.8 kg)   12/31/15 (!) 400 lb (181.4 kg)              We Performed the Following     CBC with platelets differential     Influenza A/B antigen     XR Chest 2 Views          Today's Medication Changes          These changes are accurate as of: 1/3/18  3:49 PM.  If you have any questions, ask your nurse or doctor.               Start taking these medicines.        Dose/Directions    albuterol 108 (90 BASE) MCG/ACT Inhaler   Commonly known as:  PROAIR HFA/PROVENTIL HFA/VENTOLIN HFA   Used for:  Influenza-like symptoms   Started by:  Nereida Dobson MD        Dose:  2 puff   Inhale 2 puffs into the lungs 4 times daily   Quantity:  1 Inhaler   Refills:  0       guaiFENesin-codeine 100-10 MG/5ML Soln solution   Commonly known as:  ROBITUSSIN AC   Used for:  Influenza-like symptoms   Started by:  Nereida Dobson MD        " Dose:  1 tsp.   Take 5 mLs by mouth every 4 hours as needed for cough   Quantity:  120 mL   Refills:  0         These medicines have changed or have updated prescriptions.        Dose/Directions    oxyCODONE IR 5 MG tablet   Commonly known as:  ROXICODONE   This may have changed:  Another medication with the same name was removed. Continue taking this medication, and follow the directions you see here.   Used for:  Panniculitis        Dose:  5 mg   Take 1 tablet (5 mg) by mouth every 4 hours as needed for moderate to severe pain   Quantity:  30 tablet   Refills:  0            Where to get your medicines      These medications were sent to [facility] – [place], [place] - [address]  [address] [facility] [address]     Phone:  [phone]     albuterol 108 (90 BASE) MCG/ACT Inhaler         Some of these will need a paper prescription and others can be bought over the counter.  Ask your nurse if you have questions.     Bring a paper prescription for each of these medications     guaiFENesin-codeine 100-10 MG/5ML Soln solution                Primary Care Provider Office Phone # Fax #    Baltazar Carbajal [phone] [address] [facility] [address]        Equal Access to Services     ELISSA PAYTON : Hadii krista zimmerman hadasho Soomaali, waaxda luqadaha, qaybta kaalmada adeegyada, waxay qiana hayjosh ty. So [facility] [phone].    ATENCIÓN: Si habla español, tiene a vazquez disposición servicios gratuitos de asistencia lingüística. Llame al [phone].    We comply with applicable federal civil rights laws and [place] laws. We do not discriminate on the basis of race, color, national origin, age, disability, sex, sexual orientation, or gender identity.            Thank you!     Thank you for choosing [facility]  for your care. Our goal is always to provide you with excellent care. Hearing back from our patients is  one way we can continue to improve our services. Please take a few minutes to complete the written survey that you may receive in the mail after your visit with us. Thank you!             Your Updated Medication List - Protect others around you: Learn how to safely use, store and throw away your medicines at www.disposemymeds.org.          This list is accurate as of: 1/3/18  3:49 PM.  Always use your most recent med list.                   Brand Name Dispense Instructions for use Diagnosis    albuterol 108 (90 BASE) MCG/ACT Inhaler    PROAIR HFA/PROVENTIL HFA/VENTOLIN HFA    1 Inhaler    Inhale 2 puffs into the lungs 4 times daily    Influenza-like symptoms       BD ULTRA FINE PEN NEEDLES     1 Box    1 device daily    Type 2 diabetes mellitus with target hemoglobin A1c of less than 8.0 percent (H)       blood glucose monitoring lancets     1 Box    Use to test blood sugars 4 times daily or as directed.    Type 2 diabetes mellitus with hyperglycemia (H)       blood glucose monitoring test strip    no brand specified    4 Box    Use to test blood sugar 4 times daily    Type 2 diabetes mellitus with hyperglycemia (H)       guaiFENesin-codeine 100-10 MG/5ML Soln solution    ROBITUSSIN AC    120 mL    Take 5 mLs by mouth every 4 hours as needed for cough    Influenza-like symptoms       * insulin glargine 100 UNIT/ML injection    LANTUS    60 mL    65 units daily (or as directed by providers) in the morning.    Type 2 diabetes mellitus with hyperglycemia (H)       * BASAGLAR 100 UNIT/ML injection     3 mL    Inject 65 Units Subcutaneous 2 times daily    Type 2 diabetes mellitus with hyperglycemia, without long-term current use of insulin (H)       metFORMIN 500 MG tablet    GLUCOPHAGE    180 tablet    Take 1 tablet (500 mg) by mouth 2 times daily (with meals)    Glucosuria       order for DME      Auto-CPAP: Max 15 cm H2O Min 5 cm H2O  Continuous  Lifetime need and heated humidity.    WILLIAN (obstructive sleep apnea)        oxyCODONE IR 5 MG tablet    ROXICODONE    30 tablet    Take 1 tablet (5 mg) by mouth every 4 hours as needed for moderate to severe pain    Panniculitis       pantoprazole 40 MG EC tablet    PROTONIX    90 tablet    Take 1 tablet (40 mg) by mouth daily Take 30-60 minutes before a meal.    Gastroesophageal reflux disease, esophagitis presence not specified       sertraline 100 MG tablet    ZOLOFT    180 tablet    Take 2 tablets (200 mg) by mouth daily    Anxiety state, unspecified       XANAX PO      Take 0.25 mg by mouth Takes 4 tabs as needed.        * Notice:  This list has 2 medication(s) that are the same as other medications prescribed for you. Read the directions carefully, and ask your doctor or other care provider to review them with you.

## 2018-01-03 NOTE — PATIENT INSTRUCTIONS
Thank you for choosing [facility].  You may be receiving a survey in the mail from [facility] regarding your visit today.  Please take a few minutes to complete and return the survey to let us know how we are doing.      Our Clinic hours are:  Mondays    7:20 am - 7 pm  Tues -  Fri  7:20 am - 5 pm    Clinic Phone: [phone]    The clinic lab opens at 7:30 am Mon - Fri and appointments are required.    [facility]. [phone]  Monday-Thursday 8 am - 7pm  Tues/Wed/Fri 8 am - 5:30 pm

## 2018-01-03 NOTE — NURSING NOTE
"Chief Complaint   Patient presents with     URI       Initial BP (!) 150/95  Pulse 98  Temp 99.4  F (37.4  C) (Tympanic)  Resp 20  Ht 6' 2.5\" (1.892 m)  Wt (!) 376 lb (170.6 kg)  SpO2 90%  BMI 47.63 kg/m2 Estimated body mass index is 47.63 kg/(m^2) as calculated from the following:    Height as of this encounter: 6' 2.5\" (1.892 m).    Weight as of this encounter: 376 lb (170.6 kg).  Medication Reconciliation: complete    "

## 2018-01-09 DIAGNOSIS — E11.65 TYPE 2 DIABETES MELLITUS WITH HYPERGLYCEMIA (H): ICD-10-CM

## 2018-01-09 NOTE — TELEPHONE ENCOUNTER
Requested Prescriptions   Pending Prescriptions Disp Refills     blood glucose monitoring (NO BRAND SPECIFIED) test strip  Last Written Prescription Date:  06/14/2017  Last Fill Quantity: 4,  # refills: 3   Last Office Visit with G, P or [facility] prescribing provider:  01/03/2018   Future Office Visit:      4 Box 3     Sig: Use to test blood sugar 4 times daily    Diabetic Supplies Protocol Passed    1/9/2018 10:03 AM       Passed - Patient is 18 years of age or older       Passed - Patient has had appt within past 6 mos    IV to PO - Antibiotics     None                    Gavin Ocampo RT (R)

## 2018-06-27 DIAGNOSIS — K21.9 GASTROESOPHAGEAL REFLUX DISEASE, ESOPHAGITIS PRESENCE NOT SPECIFIED: ICD-10-CM

## 2018-06-27 DIAGNOSIS — R81 GLUCOSURIA: ICD-10-CM

## 2018-06-27 NOTE — TELEPHONE ENCOUNTER
"Requested Prescriptions   Pending Prescriptions Disp Refills     pantoprazole (PROTONIX) 40 MG EC tablet  Last Written Prescription Date:  06/12/2017  Last Fill Quantity: 90,  # refills: 3   Last office visit: 1/3/2018 with prescribing provider:  sergio   Future Office Visit:     90 tablet 3     Sig: Take 1 tablet (40 mg) by mouth daily Take 30-60 minutes before a meal.    PPI Protocol Passed    6/27/2018  2:00 PM       Passed - Not on Clopidogrel (unless Pantoprazole ordered)       Passed - No diagnosis of osteoporosis on record       Passed - Recent (12 mo) or future (30 days) visit within the authorizing provider's specialty    Patient had office visit in the last 12 months or has a visit in the next 30 days with authorizing provider or within the authorizing provider's specialty.  See \"Patient Info\" tab in inbasket, or \"Choose Columns\" in Meds & Orders section of the refill encounter.           Passed - Patient is age 18 or older        metFORMIN (GLUCOPHAGE) 500 MG tablet  Last Written Prescription Date:  06/12/2017  Last Fill Quantity: 180,  # refills: 3   Last office visit: 1/3/2018 with prescribing provider:  sergio   Future Office Visit:     180 tablet 3     Sig: Take 1 tablet (500 mg) by mouth 2 times daily (with meals)    Biguanide Agents Failed    6/27/2018  2:00 PM       Failed - Blood pressure less than 140/90 in past 6 months    BP Readings from Last 3 Encounters:   01/03/18 (!) 150/95   10/24/17 130/88   06/12/17 (!) 131/91                Failed - Patient has documented LDL within the past 12 mos.    Recent Labs   Lab Test  11/16/15   1014   LDL  69            Failed - Patient has had a Microalbumin in the past 12 mos.    No lab results found.         Failed - Patient has documented A1c within the specified period of time.    If HgbA1C is 8 or greater, it needs to be on file within the past 3 months.  If less than 8, must be on file within the past 6 months.     Recent Labs   Lab Test  06/12/17   " "1527   A1C  11.7*            Passed - Patient is age 10 or older       Passed - Patient's CR is NOT>1.4 OR Patient's EGFR is NOT<45 within past 12 mos.    Recent Labs   Lab Test  10/24/17   1545   GFRESTIMATED  >90   GFRESTBLACK  >90       Recent Labs   Lab Test  10/24/17   1545   CR  0.89            Passed - Patient does NOT have a diagnosis of CHF.       Passed - Recent (6 mo) or future (30 days) visit within the authorizing provider's specialty    Patient had office visit in the last 6 months or has a visit in the next 30 days with authorizing provider or within the authorizing provider's specialty.  See \"Patient Info\" tab in inbasket, or \"Choose Columns\" in Meds & Orders section of the refill encounter.            Gavin Ocampo RT (R)    "

## 2018-06-29 RX ORDER — PANTOPRAZOLE SODIUM 40 MG/1
40 TABLET, DELAYED RELEASE ORAL DAILY
Qty: 90 TABLET | Refills: 0 | Status: SHIPPED | OUTPATIENT
Start: 2018-06-29 | End: 2018-09-14

## 2018-06-29 NOTE — TELEPHONE ENCOUNTER
Prescription approved per [facility] Refill Protocol.  Reminder that labs and appt DUE July 2018. [email or web address]

## 2018-09-14 DIAGNOSIS — K21.9 GASTROESOPHAGEAL REFLUX DISEASE, ESOPHAGITIS PRESENCE NOT SPECIFIED: ICD-10-CM

## 2018-09-14 RX ORDER — PANTOPRAZOLE SODIUM 40 MG/1
40 TABLET, DELAYED RELEASE ORAL DAILY
Qty: 90 TABLET | Refills: 0 | Status: SHIPPED | OUTPATIENT
Start: 2018-09-14 | End: 2018-10-26

## 2018-09-14 NOTE — TELEPHONE ENCOUNTER
"Requested Prescriptions   Pending Prescriptions Disp Refills     pantoprazole (PROTONIX) 40 MG EC tablet  Last Written Prescription Date:  6/29/2018  Last Fill Quantity: 90,  # refills: 0   Last office visit: 6/12/2017 with prescribing provider:  Solomon   Future Office Visit:     90 tablet 0     Sig: Take 1 tablet (40 mg) by mouth daily Take 30-60 minutes before a meal.    PPI Protocol Passed    9/14/2018  9:09 AM       Passed - Not on Clopidogrel (unless Pantoprazole ordered)       Passed - No diagnosis of osteoporosis on record       Passed - Recent (12 mo) or future (30 days) visit within the authorizing provider's specialty    Patient had office visit in the last 12 months or has a visit in the next 30 days with authorizing provider or within the authorizing provider's specialty.  See \"Patient Info\" tab in inbasket, or \"Choose Columns\" in Meds & Orders section of the refill encounter.           Passed - Patient is age 18 or older          "

## 2018-09-14 NOTE — LETTER
[facility]  [address]  [facility] [address]  Phone: [phone]       September 14, 2018         Ghanshyam Suggs  [address] [facility] [address]            Dear Ghanshyam:    We recently provided you with medication refills. Many medications require routine follow-up with your doctor or routine labs.    Your prescription(s) have been refilled for 90 days so you may have time for the above noted follow-up. Please call to schedule soon so we can assure you have an appointment before your next refills are needed.    Thank you,      Baltazar Carbajal MD/ [place]

## 2018-09-14 NOTE — TELEPHONE ENCOUNTER
90 day supply sent per protocol with reminder letter to schedule appointment.     Soraya MISHRA RN

## 2018-10-02 DIAGNOSIS — E11.9 TYPE 2 DIABETES MELLITUS WITH TARGET HEMOGLOBIN A1C OF LESS THAN 8.0 PERCENT (H): ICD-10-CM

## 2018-10-02 NOTE — TELEPHONE ENCOUNTER
Prescription approved per [facility] Refill Protocol.    Christina CHAMPION Rn

## 2018-10-02 NOTE — TELEPHONE ENCOUNTER
Requested Prescriptions   Pending Prescriptions Disp Refills     BD ULTRA FINE PEN NEEDLES 1 Box 3     Si device daily    Last Written Prescription Date:  2017  Last Fill Quantity: 1 box,  # refills: 3   Last office visit: 2017 with prescribing provider:   Solomon  Future Office Visit:

## 2018-10-17 DIAGNOSIS — E11.65 TYPE 2 DIABETES MELLITUS WITH HYPERGLYCEMIA (H): ICD-10-CM

## 2018-10-17 NOTE — TELEPHONE ENCOUNTER
Patient is due for DM recheck.  Appt scheduled.  He has enough test strips to get to that appt.  Bibi CHAMPION RN

## 2018-10-17 NOTE — TELEPHONE ENCOUNTER
"Requested Prescriptions   Pending Prescriptions Disp Refills     blood glucose monitoring (NO BRAND SPECIFIED) test strip 4 Box 3     Sig: Use to test blood sugar 4 times daily    Diabetic Supplies Protocol Failed    10/17/2018  1:47 PM       Failed - Recent (6 mo) or future (30 days) visit within the authorizing provider's specialty    Patient had office visit in the last 6 months or has a visit in the next 30 days with authorizing provider.  See \"Patient Info\" tab in inbasket, or \"Choose Columns\" in Meds & Orders section of the refill encounter.           Passed - Patient is 18 years of age or older          "

## 2018-10-17 NOTE — TELEPHONE ENCOUNTER
Requested Prescriptions   Pending Prescriptions Disp Refills     blood glucose monitoring (NO BRAND SPECIFIED) test strip  Last Written Prescription Date:  10/01/18  Last Fill Quantity: 4,  # refills: 3   Last office visit: 1/3/2018 with prescribing provider:  CLARITA Dobson   Future Office Visit:     4 Box 3     Sig: Use to test blood sugar 4 times daily    There is no refill protocol information for this order

## 2018-10-26 ENCOUNTER — OFFICE VISIT (OUTPATIENT)
Dept: FAMILY MEDICINE | Facility: CLINIC | Age: 38
End: 2018-10-26
Payer: COMMERCIAL

## 2018-10-26 VITALS
OXYGEN SATURATION: 95 % | RESPIRATION RATE: 18 BRPM | SYSTOLIC BLOOD PRESSURE: 138 MMHG | BODY MASS INDEX: 39.17 KG/M2 | HEIGHT: 75 IN | DIASTOLIC BLOOD PRESSURE: 88 MMHG | HEART RATE: 80 BPM | TEMPERATURE: 98.3 F | WEIGHT: 315 LBS

## 2018-10-26 DIAGNOSIS — Z79.4 TYPE 2 DIABETES MELLITUS WITHOUT COMPLICATION, WITH LONG-TERM CURRENT USE OF INSULIN (H): Primary | ICD-10-CM

## 2018-10-26 DIAGNOSIS — K21.9 GASTROESOPHAGEAL REFLUX DISEASE, ESOPHAGITIS PRESENCE NOT SPECIFIED: ICD-10-CM

## 2018-10-26 DIAGNOSIS — R81 GLUCOSURIA: ICD-10-CM

## 2018-10-26 DIAGNOSIS — E11.9 TYPE 2 DIABETES MELLITUS WITHOUT COMPLICATION, WITH LONG-TERM CURRENT USE OF INSULIN (H): Primary | ICD-10-CM

## 2018-10-26 DIAGNOSIS — R21 RASH: ICD-10-CM

## 2018-10-26 LAB
CHOLEST SERPL-MCNC: 151 MG/DL
CREAT UR-MCNC: 141 MG/DL
HBA1C MFR BLD: 7.5 % (ref 0–5.6)
HDLC SERPL-MCNC: 39 MG/DL
LDLC SERPL CALC-MCNC: 82 MG/DL
MICROALBUMIN UR-MCNC: 5 MG/L
MICROALBUMIN/CREAT UR: 3.87 MG/G CR (ref 0–17)
NONHDLC SERPL-MCNC: 112 MG/DL
TRIGL SERPL-MCNC: 148 MG/DL
TSH SERPL DL<=0.005 MIU/L-ACNC: 1.96 MU/L (ref 0.4–4)

## 2018-10-26 PROCEDURE — 36415 COLL VENOUS BLD VENIPUNCTURE: CPT | Performed by: FAMILY MEDICINE

## 2018-10-26 PROCEDURE — 84443 ASSAY THYROID STIM HORMONE: CPT | Performed by: FAMILY MEDICINE

## 2018-10-26 PROCEDURE — 83036 HEMOGLOBIN GLYCOSYLATED A1C: CPT | Performed by: FAMILY MEDICINE

## 2018-10-26 PROCEDURE — 80061 LIPID PANEL: CPT | Performed by: FAMILY MEDICINE

## 2018-10-26 PROCEDURE — 99214 OFFICE O/P EST MOD 30 MIN: CPT | Performed by: FAMILY MEDICINE

## 2018-10-26 PROCEDURE — 82043 UR ALBUMIN QUANTITATIVE: CPT | Performed by: FAMILY MEDICINE

## 2018-10-26 RX ORDER — PANTOPRAZOLE SODIUM 40 MG/1
40 TABLET, DELAYED RELEASE ORAL DAILY
Qty: 90 TABLET | Refills: 3 | Status: SHIPPED | OUTPATIENT
Start: 2018-10-26 | End: 2019-10-13

## 2018-10-26 RX ORDER — ATORVASTATIN CALCIUM 40 MG/1
40 TABLET, FILM COATED ORAL DAILY
Qty: 90 TABLET | Refills: 3 | Status: SHIPPED | OUTPATIENT
Start: 2018-10-26 | End: 2019-11-12

## 2018-10-26 ASSESSMENT — PATIENT HEALTH QUESTIONNAIRE - PHQ9: SUM OF ALL RESPONSES TO PHQ QUESTIONS 1-9: 8

## 2018-10-26 NOTE — PATIENT INSTRUCTIONS
Thank you for choosing [facility].  You may be receiving a survey in the mail from [facility] regarding your visit today.  Please take a few minutes to complete and return the survey to let us know how we are doing.      Our Clinic hours are:  Mondays    7:20 am - 7 pm  Tues - Fri  7:20 am - 5 pm    Clinic Phone: [phone]    The clinic lab opens at 7:30 am Mon - Fri and appointments are required.    [facility]. [phone]  Monday  8 am - 7pm  Tues - Fri 8 am - 5:30 pm

## 2018-10-26 NOTE — LETTER
My Depression Action Plan  Name: Ghanshyam Suggs   Date of Birth 1980  Date: 10/26/2018    My doctor: Baltazar Carbajal   My clinic: [facility] [place]  [address]  [facility] [address]  [phone]          GREEN    ZONE   Good Control    What it looks like:     Things are going generally well. You have normal up s and down s. You may even feel depressed from time to time, but bad moods usually last less than a day.   What you need to do:  1. Continue to care for yourself (see self care plan)  2. Check your depression survival kit and update it as needed  3. Follow your physician s recommendations including any medication.  4. Do not stop taking medication unless you consult with your physician first.           YELLOW         ZONE Getting Worse    What it looks like:     Depression is starting to interfere with your life.     It may be hard to get out of bed; you may be starting to isolate yourself from others.    Symptoms of depression are starting to last most all day and this has happened for several days.     You may have suicidal thoughts but they are not constant.   What you need to do:     1. Call your care team, your response to treatment will improve if you keep your care team informed of your progress. Yellow periods are signs an adjustment may need to be made.     2. Continue your self-care, even if you have to fake it!    3. Talk to someone in your support network    4. Open up your depression survival kit           RED    ZONE Medical Alert - Get Help    What it looks like:     Depression is seriously interfering with your life.     You may experience these or other symptoms: You can t get out of bed most days, can t work or engage in other necessary activities, you have trouble taking care of basic hygiene, or basic responsibilities, thoughts of suicide or death that will not go away, self-injurious behavior.     What you need to do:  1. Call your care team and  request a same-day appointment. If they are not available (weekends or after hours) call your local crisis line, emergency room or 911.            Depression Self Care Plan / Survival Kit    Self-Care for Depression  Here s the deal. Your body and mind are really not as separate as most people think.  What you do and think affects how you feel and how you feel influences what you do and think. This means if you do things that people who feel good do, it will help you feel better.  Sometimes this is all it takes.  There is also a place for medication and therapy depending on how severe your depression is, so be sure to consult with your medical provider and/ or Behavioral Health Consultant if your symptoms are worsening or not improving.     In order to better manage my stress, I will:    Exercise  Get some form of exercise, every day. This will help reduce pain and release endorphins, the  feel good  chemicals in your brain. This is almost as good as taking antidepressants!  This is not the same as joining a gym and then never going! (they count on that by the way ) It can be as simple as just going for a walk or doing some gardening, anything that will get you moving.      Hygiene   Maintain good hygiene (Get out of bed in the morning, Make your bed, Brush your teeth, Take a shower, and Get dressed like you were going to work, even if you are unemployed).  If your clothes don't fit try to get ones that do.    Diet  I will strive to eat foods that are good for me, drink plenty of water, and avoid excessive sugar, caffeine, alcohol, and other mood-altering substances.  Some foods that are helpful in depression are: complex carbohydrates, B vitamins, flaxseed, fish or fish oil, fresh fruits and vegetables.    Psychotherapy  I agree to participate in Individual Therapy (if recommended).    Medication  If prescribed medications, I agree to take them.  Missing doses can result in serious side effects.  I understand that  drinking alcohol, or other illicit drug use, may cause potential side effects.  I will not stop my medication abruptly without first discussing it with my provider.    Staying Connected With Others  I will stay in touch with my friends, family members, and my primary care provider/team.    Use your imagination  Be creative.  We all have a creative side; it doesn t matter if it s oil painting, sand castles, or mud pies! This will also kick up the endorphins.    Witness Beauty  (AKA stop and smell the roses) Take a look outside, even in mid-winter. Notice colors, textures. Watch the squirrels and birds.     Service to others  Be of service to others.  There is always someone else in need.  By helping others we can  get out of ourselves  and remember the really important things.  This also provides opportunities for practicing all the other parts of the program.    Humor  Laugh and be silly!  Adjust your TV habits for less news and crime-drama and more comedy.    Control your stress  Try breathing deep, massage therapy, biofeedback, and meditation. Find time to relax each day.     My support system    Clinic Contact:  Phone number:    Contact 1:  Phone number:    Contact 2:  Phone number:    Spiritism/:  Phone number:    Therapist:  Phone number:    Local crisis center:    Phone number:    Other community support:  Phone number:

## 2018-10-26 NOTE — LETTER
October 30, 2018      Ghanshyam Suggs  [address] [facility] [address]        Dear ,    We are writing to inform you of your test results.    Your test results are acceptable.    Results for orders placed or performed in visit on 10/26/18   Hemoglobin A1c   Result Value Ref Range    Hemoglobin A1C 7.5 (H) 0 - 5.6 %   Albumin Random Urine Quantitative with Creat Ratio   Result Value Ref Range    Creatinine Urine 141 mg/dL    Albumin Urine mg/L 5 mg/L    Albumin Urine mg/g Cr 3.87 0 - 17 mg/g Cr   TSH with free T4 reflex   Result Value Ref Range    TSH 1.96 0.40 - 4.00 mU/L   Lipid panel reflex to direct LDL Fasting   Result Value Ref Range    Cholesterol 151 <200 mg/dL    Triglycerides 148 <150 mg/dL    HDL Cholesterol 39 (L) >39 mg/dL    LDL Cholesterol Calculated 82 <100 mg/dL    Non HDL Cholesterol 112 <130 mg/dL         If you have any questions or concerns, please call the clinic at the number listed above.       Sincerely,        Baltazar Carbajal MD

## 2018-10-26 NOTE — MR AVS SNAPSHOT
After Visit Summary   10/26/2018    Ghanshyam Suggs    MRN: [id number]           Patient Information     Date Of Birth          1980        Visit Information        Provider Department      10/26/2018 10:00 AM Baltazar Carbajal MD [facility] [place]        Today's Diagnoses     Type 2 diabetes mellitus without complication, with long-term current use of insulin (H)    -  1    Gastroesophageal reflux disease, esophagitis presence not specified        Glucosuria        Rash          Care Instructions          Thank you for choosing [facility].  You may be receiving a survey in the mail from [facility] regarding your visit today.  Please take a few minutes to complete and return the survey to let us know how we are doing.      Our Clinic hours are:  Mondays    7:20 am - 7 pm  Tues -  Fri  7:20 am - 5 pm    Clinic Phone: [phone]    The clinic lab opens at 7:30 am Mon - Fri and appointments are required.    [facility]. [phone]  Monday  8 am - 7pm  Tues - Fri 8 am - 5:30 pm                 Follow-ups after your visit        Additional Services     DERMATOLOGY REFERRAL       Your provider has referred you to: [facility]: [facility] [phone]   [email or web address]    Please be aware that coverage of these services is subject to the terms and limitations of your health insurance plan.  Call member services at your health plan with any benefit or coverage questions.      Please bring the following with you to your appointment:    (1) Any X-Rays, CTs or MRIs which have been performed.  Contact the facility where they were done to arrange for  prior to your scheduled appointment.    (2) List of current medications  (3) This referral request   (4) Any documents/labs given to you for this referral                  Who to contact     If you have questions or need follow up information about today's [facility]  "visit or your schedule please contact [facility] directly at [phone].  Normal or non-critical lab and imaging results will be communicated to you by MyChart, letter or phone within 4 business days after the clinic has received the results. If you do not hear from us within 7 days, please contact the clinic through MyChart or phone. If you have a critical or abnormal lab result, we will notify you by phone as soon as possible.  Submit refill requests through [employer] or call your pharmacy and they will forward the refill request to us. Please allow 3 business days for your refill to be completed.          Additional Information About Your Visit        Care EveryWhere ID     This is your Care EveryWhere ID. This could be used by other organizations to access your [place] medical records  [id number]        Your Vitals Were     Pulse Temperature Respirations Height Pulse Oximetry BMI (Body Mass Index)    80 98.3  F (36.8  C) 18 6' 2.5\" (1.892 m) 95% 48.52 kg/m2       Blood Pressure from Last 3 Encounters:   10/26/18 138/88   01/03/18 (!) 150/95   10/24/17 130/88    Weight from Last 3 Encounters:   10/26/18 (!) 383 lb (173.7 kg)   01/03/18 (!) 376 lb (170.6 kg)   06/12/17 (!) 334 lb 9.6 oz (151.8 kg)              We Performed the Following     Albumin Random Urine Quantitative with Creat Ratio     DEPRESSION ACTION PLAN (DAP)     DERMATOLOGY REFERRAL     Hemoglobin A1c     Lipid panel reflex to direct LDL Fasting     TSH with free T4 reflex          Today's Medication Changes          These changes are accurate as of 10/26/18 11:20 AM.  If you have any questions, ask your nurse or doctor.               Start taking these medicines.        Dose/Directions    atorvastatin 40 MG tablet   Commonly known as:  LIPITOR   Used for:  Type 2 diabetes mellitus without complication, with long-term current use of insulin (H)   Started by:  Baltazar Carbajal MD        Dose:  40 mg   Take 1 tablet (40 mg) by " mouth daily   Quantity:  90 tablet   Refills:  3         These medicines have changed or have updated prescriptions.        Dose/Directions    BASAGLAR 100 UNIT/ML injection   This may have changed:  Another medication with the same name was removed. Continue taking this medication, and follow the directions you see here.   Used for:  Type 2 diabetes mellitus with hyperglycemia, without long-term current use of insulin (H)   Changed by:  Baltazar Carbajal MD        Dose:  65 Units   Inject 65 Units Subcutaneous 2 times daily   Quantity:  3 mL   Refills:  11       metFORMIN 500 MG tablet   Commonly known as:  GLUCOPHAGE   This may have changed:  additional instructions   Used for:  Glucosuria   Changed by:  Baltazar Carbajal MD        Dose:  500 mg   Take 1 tablet (500 mg) by mouth 2 times daily (with meals)   Quantity:  180 tablet   Refills:  3            Where to get your medicines      These medications were sent to [facility]. [address] [address]     Phone:  [phone]     atorvastatin 40 MG tablet    metFORMIN 500 MG tablet    pantoprazole 40 MG EC tablet                Primary Care Provider Office Phone # Fax #    Baltazar Carbajal [phone] [id number] [facility] [address]        Equal Access to Services     IZA PYATON AH: Hadii krista zimmerman hadasho Soarslanali, waaxda luqadaha, qaybta kaalmada adeegyada, manuel kiran hayjosh ty. So [facility] [phone].    ATENCIÓN: Si habla español, tiene a vazquez disposición servicios gratuitos de asistencia lingüística. Llame al [phone].    We comply with applicable federal civil rights laws and [place] laws. We do not discriminate on the basis of race, color, national origin, age, disability, sex, sexual orientation, or gender identity.            Thank you!     Thank you for choosing [facility]  for your care. Our goal is always  to provide you with excellent care. Hearing back from our patients is one way we can continue to improve our services. Please take a few minutes to complete the written survey that you may receive in the mail after your visit with us. Thank you!             Your Updated Medication List - Protect others around you: Learn how to safely use, store and throw away your medicines at www.disposemymeds.org.          This list is accurate as of 10/26/18 11:20 AM.  Always use your most recent med list.                   Brand Name Dispense Instructions for use Diagnosis    albuterol 108 (90 Base) MCG/ACT inhaler    PROAIR HFA/PROVENTIL HFA/VENTOLIN HFA    1 Inhaler    Inhale 2 puffs into the lungs 4 times daily    Influenza-like symptoms       atorvastatin 40 MG tablet    LIPITOR    90 tablet    Take 1 tablet (40 mg) by mouth daily    Type 2 diabetes mellitus without complication, with long-term current use of insulin (H)       BASAGLAR 100 UNIT/ML injection     3 mL    Inject 65 Units Subcutaneous 2 times daily    Type 2 diabetes mellitus with hyperglycemia, without long-term current use of insulin (H)       BD ULTRA FINE PEN NEEDLES     1 Box    1 device daily    Type 2 diabetes mellitus with target hemoglobin A1c of less than 8.0 percent (H)       blood glucose monitoring lancets     1 Box    Use to test blood sugars 4 times daily or as directed.    Type 2 diabetes mellitus with hyperglycemia (H)       blood glucose monitoring test strip    no brand specified    4 Box    Use to test blood sugar 4 times daily    Type 2 diabetes mellitus with hyperglycemia (H)       guaiFENesin-codeine 100-10 MG/5ML Soln solution    ROBITUSSIN AC    120 mL    Take 5 mLs by mouth every 4 hours as needed for cough    Influenza-like symptoms       metFORMIN 500 MG tablet    GLUCOPHAGE    180 tablet    Take 1 tablet (500 mg) by mouth 2 times daily (with meals)    Glucosuria       order for DME      Auto-CPAP: Max 15 cm H2O Min 5 cm H2O   Continuous  Lifetime need and heated humidity.    WILLIAN (obstructive sleep apnea)       oxyCODONE IR 5 MG tablet    ROXICODONE    30 tablet    Take 1 tablet (5 mg) by mouth every 4 hours as needed for moderate to severe pain    Panniculitis       pantoprazole 40 MG EC tablet    PROTONIX    90 tablet    Take 1 tablet (40 mg) by mouth daily Take 30-60 minutes before a meal.    Gastroesophageal reflux disease, esophagitis presence not specified       sertraline 100 MG tablet    ZOLOFT    180 tablet    Take 2 tablets (200 mg) by mouth daily    Anxiety state, unspecified       XANAX PO      Take 0.25 mg by mouth Takes 4 tabs as needed.

## 2018-10-26 NOTE — PROGRESS NOTES
SUBJECTIVE:   Ghanshyam Suggs is a 38 year old male who presents to clinic today for the following health issues:      Diabetes Follow-up    Patient is checking blood sugars: once daily.  Results are as follows:         am - 150-275    Diabetic concerns: None     Symptoms of hypoglycemia (low blood sugar): none     Paresthesias (numbness or burning in feet) or sores: Yes      Date of last diabetic eye exam: DUE     BP Readings from Last 2 Encounters:   10/26/18 138/88   01/03/18 (!) 150/95     Hemoglobin A1C (%)   Date Value   06/12/2017 11.7 (H)   12/22/2015 7.0 (H)     LDL Cholesterol Calculated (mg/dL)   Date Value   11/16/2015 69   01/09/2008 91       Diabetes Management Resources    Amount of exercise or physical activity: None    Problems taking medications regularly: No    Medication side effects: none    Diet: low salt, diabetic and carbohydrate counting        Rash      Duration: years  Much worse x 3 mo.    Description  Location: arms and legs   Itching: severe    Intensity:  severe    Accompanying signs and symptoms: None    History (similar episodes/previous evaluation):     Precipitating or alleviating factors:  New exposures:  None  Recent travel: no      Therapies tried and outcome: none      Problem list and histories reviewed & adjusted, as indicated.  Additional history: as documented        Reviewed and updated as needed this visit by clinical staff  Tobacco  Allergies  Meds       Reviewed and updated as needed this visit by Provider      Physician note based upon further history obtained, clarified or corrected and including examination performed:    HPI:  He is still smoking though he is trying to quit.  He is monitoring his blood sugars and using his insulin.  He is complaining of an itchy rash on his legs and arms which is been there for months.  It starts with an itch but no visible signs on the skin and then after scratching for some time he gets some red excoriated papules.    ROS:  "(Except as listed above in HPI the following systems are basically negative)  General: No change in weight, sleep or appetite.  Normal energy.  No fever or chills, no excessive fatigue or daytime drowsiness  Eyes: Negative for vision changes or eye problems  ENT: No problems with ears, nose or throat.  No difficulty swallowing.  Resp: No coughing, wheezing or shortness of breath, denies apnea  CV: No chest pains or palpitations  GI: No nausea, vomiting,  heartburn, abdominal pain, diarrhea, constipation or change in bowel habits  : No urinary frequency or dysuria, bladder or kidney problems  Musculoskeletal: No significant muscle or joint pains  Neurologic: No headaches, numbness, tingling, weakness, problems with balance or coordination  Skin: No rashes,worrisome lesions or skin problems    Social History     Social History     Marital status: Single     Spouse name: N/A     Number of children: N/A     Years of education: N/A     Social History Main Topics     Smoking status: Former Smoker     Packs/day: 0.50     Years: 10.00     Types: Cigarettes     Quit date: 10/7/2014     Smokeless tobacco: Former User      Comment: e cigg occ     Alcohol use No      Comment: rarely     Drug use: No     Sexual activity: Yes     Partners: Female     Other Topics Concern     Parent/Sibling W/ Cabg, Mi Or Angioplasty Before 65f 55m? No     Social History Narrative       /88  Pulse 80  Temp 98.3  F (36.8  C)  Resp 18  Ht 6' 2.5\" (1.892 m)  Wt (!) 383 lb (173.7 kg)  SpO2 95%  BMI 48.52 kg/m2  HEAD: AT/NC  EYES: PERRLA, EOMI, Sclerae clear, Fundi normal with sharp discs  EARS: TMs clear, canals normal  NOSE & THROAT: clear   LUNGS: clear to auscultation, normal breath sounds  CV: RRR without murmur  ABD: BS+, soft, nontender, no masses, no hepatosplenomegaly  EXTREMITIES: without joint tenderness, swelling or erythema.  No muscle tenderness or abnormality.  SKIN: No rashes or abnormalities, except for multiple small " excoriations on the forearms and lower extremities.  NEURO:non focal exam    ASSESSMENT:      Type 2 diabetes mellitus without complication, with long-term current use of insulin (H)  Gastroesophageal reflux disease, esophagitis presence not specified  Glucosuria  Rash    The diagnoses listed above were all addressed on this visit. Some may be listed just as ongoing yet needing a medication refill, in which case that was discussed and confirmed with the patient at this visit.    PLAN:  We will start him on Lipitor  Dermatology consultation for the rash  Encouraging quitting smoking  Encouraged in weight loss  Labs today.    Orders Placed This Encounter     DEPRESSION ACTION PLAN (DAP)     Hemoglobin A1c     Albumin Random Urine Quantitative with Creat Ratio     TSH with free T4 reflex     Lipid panel reflex to direct LDL Fasting     DERMATOLOGY REFERRAL     pantoprazole (PROTONIX) 40 MG EC tablet     metFORMIN (GLUCOPHAGE) 500 MG tablet     atorvastatin (LIPITOR) 40 MG tablet

## 2018-12-18 ENCOUNTER — HOSPITAL ENCOUNTER (EMERGENCY)
Facility: CLINIC | Age: 38
Discharge: HOME | End: 2018-12-18
Attending: EMERGENCY MEDICINE | Admitting: EMERGENCY MEDICINE
Payer: COMMERCIAL

## 2018-12-18 ENCOUNTER — APPOINTMENT (OUTPATIENT)
Dept: CT IMAGING | Facility: CLINIC | Age: 38
End: 2018-12-18
Attending: EMERGENCY MEDICINE
Payer: COMMERCIAL

## 2018-12-18 VITALS
TEMPERATURE: 98.6 F | DIASTOLIC BLOOD PRESSURE: 73 MMHG | RESPIRATION RATE: 16 BRPM | BODY MASS INDEX: 44.34 KG/M2 | OXYGEN SATURATION: 96 % | HEART RATE: 83 BPM | SYSTOLIC BLOOD PRESSURE: 136 MMHG | WEIGHT: 315 LBS

## 2018-12-18 DIAGNOSIS — R10.2 ABDOMINAL PAIN, SUPRAPUBIC: ICD-10-CM

## 2018-12-18 DIAGNOSIS — N41.0 PROSTATITIS, ACUTE: ICD-10-CM

## 2018-12-18 LAB
ALBUMIN SERPL-MCNC: 3.8 G/DL (ref 3.4–5)
ALBUMIN UR-MCNC: NEGATIVE MG/DL
ALP SERPL-CCNC: 100 U/L (ref 40–150)
ALT SERPL W P-5'-P-CCNC: 68 U/L (ref 0–70)
ANION GAP SERPL CALCULATED.3IONS-SCNC: 7 MMOL/L (ref 3–14)
APPEARANCE UR: CLEAR
AST SERPL W P-5'-P-CCNC: 57 U/L (ref 0–45)
BASOPHILS # BLD AUTO: 0.1 10E9/L (ref 0–0.2)
BASOPHILS NFR BLD AUTO: 0.7 %
BILIRUB SERPL-MCNC: 0.3 MG/DL (ref 0.2–1.3)
BILIRUB UR QL STRIP: NEGATIVE
BUN SERPL-MCNC: 15 MG/DL (ref 7–30)
CALCIUM SERPL-MCNC: 8.4 MG/DL (ref 8.5–10.1)
CHLORIDE SERPL-SCNC: 108 MMOL/L (ref 94–109)
CO2 SERPL-SCNC: 26 MMOL/L (ref 20–32)
COLOR UR AUTO: YELLOW
CREAT SERPL-MCNC: 0.9 MG/DL (ref 0.66–1.25)
DIFFERENTIAL METHOD BLD: NORMAL
EOSINOPHIL # BLD AUTO: 0.3 10E9/L (ref 0–0.7)
EOSINOPHIL NFR BLD AUTO: 2.6 %
ERYTHROCYTE [DISTWIDTH] IN BLOOD BY AUTOMATED COUNT: 12.8 % (ref 10–15)
GFR SERPL CREATININE-BSD FRML MDRD: >90 ML/MIN/1.7M2
GLUCOSE SERPL-MCNC: 130 MG/DL (ref 70–99)
GLUCOSE UR STRIP-MCNC: NEGATIVE MG/DL
HCT VFR BLD AUTO: 46.5 % (ref 40–53)
HGB BLD-MCNC: 16.4 G/DL (ref 13.3–17.7)
HGB UR QL STRIP: NEGATIVE
IMM GRANULOCYTES # BLD: 0.1 10E9/L (ref 0–0.4)
IMM GRANULOCYTES NFR BLD: 0.6 %
KETONES UR STRIP-MCNC: NEGATIVE MG/DL
LEUKOCYTE ESTERASE UR QL STRIP: ABNORMAL
LYMPHOCYTES # BLD AUTO: 4.1 10E9/L (ref 0.8–5.3)
LYMPHOCYTES NFR BLD AUTO: 38.4 %
MCH RBC QN AUTO: 29.8 PG (ref 26.5–33)
MCHC RBC AUTO-ENTMCNC: 35.3 G/DL (ref 31.5–36.5)
MCV RBC AUTO: 85 FL (ref 78–100)
MONOCYTES # BLD AUTO: 0.5 10E9/L (ref 0–1.3)
MONOCYTES NFR BLD AUTO: 5 %
MUCOUS THREADS #/AREA URNS LPF: PRESENT /LPF
NEUTROPHILS # BLD AUTO: 5.6 10E9/L (ref 1.6–8.3)
NEUTROPHILS NFR BLD AUTO: 52.7 %
NITRATE UR QL: NEGATIVE
NRBC # BLD AUTO: 0 10*3/UL
NRBC BLD AUTO-RTO: 0 /100
PH UR STRIP: 6 PH (ref 5–7)
PLATELET # BLD AUTO: 242 10E9/L (ref 150–450)
POTASSIUM SERPL-SCNC: 3.8 MMOL/L (ref 3.4–5.3)
PROT SERPL-MCNC: 7.4 G/DL (ref 6.8–8.8)
RBC # BLD AUTO: 5.5 10E12/L (ref 4.4–5.9)
RBC #/AREA URNS AUTO: 2 /HPF (ref 0–2)
SODIUM SERPL-SCNC: 141 MMOL/L (ref 133–144)
SOURCE: ABNORMAL
SP GR UR STRIP: 1.02 (ref 1–1.03)
SQUAMOUS #/AREA URNS AUTO: 3 /HPF (ref 0–1)
UROBILINOGEN UR STRIP-MCNC: 4 MG/DL (ref 0–2)
WBC # BLD AUTO: 10.6 10E9/L (ref 4–11)
WBC #/AREA URNS AUTO: 3 /HPF (ref 0–5)

## 2018-12-18 PROCEDURE — 25000125 ZZHC RX 250: Performed by: EMERGENCY MEDICINE

## 2018-12-18 PROCEDURE — 81001 URINALYSIS AUTO W/SCOPE: CPT | Performed by: EMERGENCY MEDICINE

## 2018-12-18 PROCEDURE — 25000132 ZZH RX MED GY IP 250 OP 250 PS 637: Performed by: EMERGENCY MEDICINE

## 2018-12-18 PROCEDURE — 25000128 H RX IP 250 OP 636: Performed by: EMERGENCY MEDICINE

## 2018-12-18 PROCEDURE — 74177 CT ABD & PELVIS W/CONTRAST: CPT

## 2018-12-18 PROCEDURE — 99285 EMERGENCY DEPT VISIT HI MDM: CPT | Mod: Z6 | Performed by: EMERGENCY MEDICINE

## 2018-12-18 PROCEDURE — 96361 HYDRATE IV INFUSION ADD-ON: CPT | Performed by: EMERGENCY MEDICINE

## 2018-12-18 PROCEDURE — 80053 COMPREHEN METABOLIC PANEL: CPT | Performed by: EMERGENCY MEDICINE

## 2018-12-18 PROCEDURE — 85025 COMPLETE CBC W/AUTO DIFF WBC: CPT | Performed by: EMERGENCY MEDICINE

## 2018-12-18 PROCEDURE — 96376 TX/PRO/DX INJ SAME DRUG ADON: CPT | Performed by: EMERGENCY MEDICINE

## 2018-12-18 PROCEDURE — 96375 TX/PRO/DX INJ NEW DRUG ADDON: CPT | Performed by: EMERGENCY MEDICINE

## 2018-12-18 PROCEDURE — 99285 EMERGENCY DEPT VISIT HI MDM: CPT | Mod: 25 | Performed by: EMERGENCY MEDICINE

## 2018-12-18 PROCEDURE — 87086 URINE CULTURE/COLONY COUNT: CPT | Performed by: EMERGENCY MEDICINE

## 2018-12-18 PROCEDURE — 96374 THER/PROPH/DIAG INJ IV PUSH: CPT | Performed by: EMERGENCY MEDICINE

## 2018-12-18 RX ORDER — KETOROLAC TROMETHAMINE 30 MG/ML
30 INJECTION, SOLUTION INTRAMUSCULAR; INTRAVENOUS ONCE
Status: COMPLETED | OUTPATIENT
Start: 2018-12-18 | End: 2018-12-18

## 2018-12-18 RX ORDER — ONDANSETRON 2 MG/ML
4 INJECTION INTRAMUSCULAR; INTRAVENOUS ONCE
Status: COMPLETED | OUTPATIENT
Start: 2018-12-18 | End: 2018-12-18

## 2018-12-18 RX ORDER — ALPRAZOLAM 0.5 MG
.5-1 TABLET ORAL DAILY PRN
Refills: 0 | COMMUNITY
Start: 2018-11-30

## 2018-12-18 RX ORDER — IOPAMIDOL 755 MG/ML
100 INJECTION, SOLUTION INTRAVASCULAR ONCE
Status: COMPLETED | OUTPATIENT
Start: 2018-12-18 | End: 2018-12-18

## 2018-12-18 RX ORDER — HYDROMORPHONE HYDROCHLORIDE 1 MG/ML
0.5 INJECTION, SOLUTION INTRAMUSCULAR; INTRAVENOUS; SUBCUTANEOUS ONCE
Status: COMPLETED | OUTPATIENT
Start: 2018-12-18 | End: 2018-12-18

## 2018-12-18 RX ORDER — OXYCODONE HYDROCHLORIDE 30 MG/1
30 TABLET ORAL EVERY 4 HOURS PRN
COMMUNITY
Start: 2018-11-28

## 2018-12-18 RX ORDER — PHENAZOPYRIDINE HYDROCHLORIDE 200 MG/1
200 TABLET, FILM COATED ORAL 3 TIMES DAILY PRN
Qty: 15 TABLET | Refills: 1 | Status: SHIPPED | OUTPATIENT
Start: 2018-12-18 | End: 2018-12-18

## 2018-12-18 RX ORDER — PHENAZOPYRIDINE HYDROCHLORIDE 200 MG/1
200 TABLET, FILM COATED ORAL 3 TIMES DAILY PRN
Qty: 15 TABLET | Refills: 1 | Status: SHIPPED | OUTPATIENT
Start: 2018-12-18 | End: 2019-01-02

## 2018-12-18 RX ORDER — PHENAZOPYRIDINE HYDROCHLORIDE 100 MG/1
200 TABLET, FILM COATED ORAL ONCE
Status: COMPLETED | OUTPATIENT
Start: 2018-12-18 | End: 2018-12-18

## 2018-12-18 RX ORDER — MORPHINE SULFATE 30 MG/1
1 TABLET, FILM COATED, EXTENDED RELEASE ORAL AT BEDTIME
COMMUNITY
Start: 2018-11-28 | End: 2021-07-28

## 2018-12-18 RX ORDER — CIPROFLOXACIN 750 MG/1
750 TABLET, FILM COATED ORAL 2 TIMES DAILY
Qty: 42 TABLET | Refills: 0 | Status: SHIPPED | OUTPATIENT
Start: 2018-12-18 | End: 2019-01-08

## 2018-12-18 RX ORDER — OXYCODONE HYDROCHLORIDE 5 MG/1
30 TABLET ORAL ONCE
Status: COMPLETED | OUTPATIENT
Start: 2018-12-18 | End: 2018-12-18

## 2018-12-18 RX ADMIN — PHENAZOPYRIDINE HYDROCHLORIDE 200 MG: 100 TABLET, FILM COATED ORAL at 22:03

## 2018-12-18 RX ADMIN — Medication 0.5 MG: at 19:45

## 2018-12-18 RX ADMIN — HYDROMORPHONE HYDROCHLORIDE 1 MG: 1 INJECTION, SOLUTION INTRAMUSCULAR; INTRAVENOUS; SUBCUTANEOUS at 22:03

## 2018-12-18 RX ADMIN — Medication 0.5 MG: at 18:40

## 2018-12-18 RX ADMIN — OXYCODONE HYDROCHLORIDE 30 MG: 5 TABLET ORAL at 20:14

## 2018-12-18 RX ADMIN — KETOROLAC TROMETHAMINE 30 MG: 30 INJECTION, SOLUTION INTRAMUSCULAR at 18:40

## 2018-12-18 RX ADMIN — SODIUM CHLORIDE 1000 ML: 9 INJECTION, SOLUTION INTRAVENOUS at 18:15

## 2018-12-18 RX ADMIN — SODIUM CHLORIDE 74 ML: 9 INJECTION, SOLUTION INTRAVENOUS at 19:03

## 2018-12-18 RX ADMIN — ONDANSETRON 4 MG: 2 INJECTION INTRAMUSCULAR; INTRAVENOUS at 18:38

## 2018-12-18 RX ADMIN — IOPAMIDOL 100 ML: 755 INJECTION, SOLUTION INTRAVENOUS at 19:02

## 2018-12-18 NOTE — ED NOTES
"Pt here with low abdominal pain, started as flank pain 5 days ago and moves around. Having dysuria, states \"it's a stabbing pain\". Pain goes down to testicles as well. Pt states he has had dark colored urine. Hx hx of kidney stones. Pt took ibuprofen PTA.  "

## 2018-12-18 NOTE — ED AVS SNAPSHOT
[facility]  [address] [address]  Phone:  [phone]  Fax:  [phone]                                    Ghanshyam Suggs   MRN: [id number]    Department:  [facility]   Date of Visit:  12/18/2018           After Visit Summary Signature Page    I have received my discharge instructions, and my questions have been answered. I have discussed any challenges I see with this plan with the nurse or doctor.    ..........................................................................................................................................  Patient/Patient Representative Signature      ..........................................................................................................................................  Patient Representative Print Name and Relationship to Patient    ..................................................               ................................................  Date                                   Time    ..........................................................................................................................................  Reviewed by Signature/Title    ...................................................              ..............................................  Date                                               Time          22EPIC Rev 08/18
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
## 2018-12-19 LAB
BACTERIA SPEC CULT: NO GROWTH
Lab: NORMAL
SPECIMEN SOURCE: NORMAL

## 2018-12-19 NOTE — ED NOTES
Pt wife asking if patient can eat and if update on POC can be given. RN to visit with MD who will check in with patient. Per MD okay to feed patient. Pt was given turkey sandwich--denies any other needs.

## 2018-12-20 NOTE — RESULT ENCOUNTER NOTE
Final urine culture report is NEGATIVE per [facility] ED Lab Result protocol.    If NEGATIVE result, no change in treatment, per [facility] ED Lab Result protocol.

## 2019-04-29 ENCOUNTER — TELEPHONE (OUTPATIENT)
Dept: FAMILY MEDICINE | Facility: CLINIC | Age: 39
End: 2019-04-29

## 2019-04-29 ASSESSMENT — PATIENT HEALTH QUESTIONNAIRE - PHQ9: SUM OF ALL RESPONSES TO PHQ QUESTIONS 1-9: 8

## 2019-04-29 NOTE — TELEPHONE ENCOUNTER
Panel Management Review      Patient has the following on his problem list:     Depression / Dysthymia review    Measure:  Needs PHQ-9 score of 4 or less during index window.  Administer PHQ-9 and if score is 5 or more, send encounter to provider for next steps.    5   7 month window range: Due    PHQ-9 SCORE 6/12/2017 1/3/2018 10/26/2018   PHQ-9 Total Score - - -   PHQ-9 Total Score 10 0 8       If PHQ-9 recheck is 5 or more, route to provider for next steps.    Patient is due for:  PHQ9    Asthma review   No flowsheet data found.   1. Is Asthma diagnosis on the Problem List? Yes    2. Is Asthma listed on Health Maintenance? Yes    3. Patient is due for:  ACT    Diabetes    ASA: Not Required     Last A1C  Lab Results   Component Value Date    A1C 7.5 10/26/2018    A1C 11.7 06/12/2017    A1C 7.0 12/22/2015    A1C 7.2 11/16/2015    A1C 15.0 04/07/2015     A1C tested: MONITOR    Last LDL:    Lab Results   Component Value Date    CHOL 151 10/26/2018     Lab Results   Component Value Date    HDL 39 10/26/2018     Lab Results   Component Value Date    LDL 82 10/26/2018     Lab Results   Component Value Date    TRIG 148 10/26/2018     Lab Results   Component Value Date    CHOLHDLRATIO 3.5 11/16/2015     Lab Results   Component Value Date    NHDL 112 10/26/2018       Is the patient on a Statin? YES             Is the patient on Aspirin? NO    Medications     HMG CoA Reductase Inhibitors     atorvastatin (LIPITOR) 40 MG tablet             Last three blood pressure readings:  BP Readings from Last 3 Encounters:   12/18/18 136/73   10/26/18 138/88   01/03/18 (!) 150/95       Date of last diabetes office visit: 10/26/18     Tobacco History:     History   Smoking Status     Former Smoker     Packs/day: 0.50     Years: 10.00     Types: Cigarettes     Quit date: 10/7/2014   Smokeless Tobacco     Former User     Comment: e cigg occ           Composite cancer screening  Chart review shows that this patient is due/due soon for the  following None  Summary:    Patient is due/failing the following:   PHQ9    Action needed:   Patient needs to do PHQ9.    Type of outreach:    Phone, spoke to patient.  Patient has no asthma concerns. Patient anxiety is high due to working and his job.     Questions for provider review:    None                                                                                                                                    Debby Alonzo MA       Chart routed to Care Team .

## 2019-04-30 ASSESSMENT — ASTHMA QUESTIONNAIRES: ACT_TOTALSCORE: 25

## 2019-05-13 DIAGNOSIS — E11.9 TYPE 2 DIABETES MELLITUS WITH TARGET HEMOGLOBIN A1C OF LESS THAN 8.0 PERCENT (H): ICD-10-CM

## 2019-06-24 DIAGNOSIS — E11.65 TYPE 2 DIABETES MELLITUS WITH HYPERGLYCEMIA, WITHOUT LONG-TERM CURRENT USE OF INSULIN (H): ICD-10-CM

## 2019-06-24 NOTE — TELEPHONE ENCOUNTER
"Requested Prescriptions   Pending Prescriptions Disp Refills     insulin glargine (BASAGLAR KWIKPEN) 100 UNIT/ML pen 3 mL 11     Sig: Inject 65 Units Subcutaneous 2 times daily  Last Written Prescription Date:  6/19/2018  Last Fill Quantity: 3ml,  # refills: 11   Last office visit: 10/26/2018 with prescribing provider:  Solomon  Future Office Visit:           Long Acting Insulin Protocol Failed - 6/24/2019  4:00 PM        Failed - Blood pressure less than 140/90 in past 6 months     BP Readings from Last 3 Encounters:   12/18/18 136/73   10/26/18 138/88   01/03/18 (!) 150/95                 Failed - HgbA1C in past 3 or 6 months     If HgbA1C is 8 or greater, it needs to be on file within the past 3 months.  If less than 8, must be on file within the past 6 months.     Recent Labs   Lab Test 10/26/18  1039   A1C 7.5*             Failed - Recent (6 mo) or future (30 days) visit within the authorizing provider's specialty     Patient had office visit in the last 6 months or has a visit in the next 30 days with authorizing provider or within the authorizing provider's specialty.  See \"Patient Info\" tab in inbasket, or \"Choose Columns\" in Meds & Orders section of the refill encounter.            Passed - LDL on file in past 12 months     Recent Labs   Lab Test 10/26/18  1039   LDL 82             Passed - Microalbumin on file in past 12 months     Recent Labs   Lab Test 10/26/18  1039   MICROL 5   UMALCR 3.87             Passed - Serum creatinine on file in past 12 months     Recent Labs   Lab Test 12/18/18  1807   CR 0.90             Passed - Medication is active on med list        Passed - Patient is age 18 or older          "

## 2019-06-25 RX ORDER — INSULIN GLARGINE 100 [IU]/ML
65 INJECTION, SOLUTION SUBCUTANEOUS 2 TIMES DAILY
Qty: 3 ML | Refills: 1 | Status: SHIPPED | OUTPATIENT
Start: 2019-06-25 | End: 2021-07-09

## 2019-06-25 NOTE — TELEPHONE ENCOUNTER
Prescription approved per [facility] Refill Protocol.  Appt scheduled for 7/30/19.  [email or web address]

## 2019-06-26 ENCOUNTER — TELEPHONE (OUTPATIENT)
Dept: FAMILY MEDICINE | Facility: CLINIC | Age: 39
End: 2019-06-26

## 2019-06-26 NOTE — TELEPHONE ENCOUNTER
Per fax received from [facility] - Basaglar Kwik Pen is not covered by patient's Insurance Company  Dr. Carbajal - Please choose:  1.  Change medication that is not covered to a different medication and send new prescription to patient's pharmacy?  2.  Patient will need to pay for the non-covered medication out-of-pocket?   3.  Try for Prior Authorization with Insurance Company to get medication covered?        P.A. Phone #:[phone]       P.A.  ID#:  [id number]    [facility] [place] [facility]

## 2019-06-27 NOTE — TELEPHONE ENCOUNTER
Prior Authorization Retail Medication Request    Medication/Dose: Basjodi Hailey Pen  ICD code (if different than what is on RX):    Previously Tried and Failed:  Novolog; lantus; metformin   Rationale:  Patient has used since 2017    Insurance Name:  [phone]  Insurance ID:  [id number]      Pharmacy Information (if different than what is on RX)  Name:    Phone:

## 2019-07-01 NOTE — TELEPHONE ENCOUNTER
**When finished: Please make a copy for station  and a copy to be sent to scanning. Send originals with patient.     Date received: July 1, 2019   Doctor: Baltazar Carbajal MD  Daytime phone number:  :     n: Mail back to patient  y: Mail or fax to destination requesting form  n: Patient to     Other notes:

## 2019-07-01 NOTE — TELEPHONE ENCOUNTER
Central Prior Authorization Team   Phone: [phone]      PA NOT NEEDED    Medication: Basaglar Kwik Pen-PA NOT  NEEDED  Insurance Company:    Pharmacy Filling the Rx:    Filling Pharmacy Phone:    Filling Pharmacy Fax:    Start Date: 7/1/2019    Called pharmacy to verify 3rd party billing information and was advised to disregard request because they were able to process the medication for a 34-day supply

## 2019-07-30 ENCOUNTER — OFFICE VISIT (OUTPATIENT)
Dept: FAMILY MEDICINE | Facility: CLINIC | Age: 39
End: 2019-07-30

## 2019-07-30 VITALS
OXYGEN SATURATION: 98 % | TEMPERATURE: 98.7 F | HEART RATE: 80 BPM | WEIGHT: 315 LBS | DIASTOLIC BLOOD PRESSURE: 70 MMHG | HEIGHT: 75 IN | RESPIRATION RATE: 16 BRPM | BODY MASS INDEX: 39.17 KG/M2 | SYSTOLIC BLOOD PRESSURE: 112 MMHG

## 2019-07-30 DIAGNOSIS — Z79.4 TYPE 2 DIABETES MELLITUS WITHOUT COMPLICATION, WITH LONG-TERM CURRENT USE OF INSULIN (H): Primary | ICD-10-CM

## 2019-07-30 DIAGNOSIS — E11.9 TYPE 2 DIABETES MELLITUS WITHOUT COMPLICATION, WITH LONG-TERM CURRENT USE OF INSULIN (H): Primary | ICD-10-CM

## 2019-07-30 LAB — HBA1C MFR BLD: 6.2 % (ref 0–5.6)

## 2019-07-30 PROCEDURE — 99213 OFFICE O/P EST LOW 20 MIN: CPT | Performed by: FAMILY MEDICINE

## 2019-07-30 PROCEDURE — 83036 HEMOGLOBIN GLYCOSYLATED A1C: CPT | Performed by: FAMILY MEDICINE

## 2019-07-30 PROCEDURE — 36415 COLL VENOUS BLD VENIPUNCTURE: CPT | Performed by: FAMILY MEDICINE

## 2019-07-30 ASSESSMENT — MIFFLIN-ST. JEOR: SCORE: 2676.42

## 2019-07-30 NOTE — PROGRESS NOTES
"Subjective     Ghanshyam Suggs is a 39 year old male who presents to clinic today for the following health issues:    HPI   Diabetes Follow-up      How often are you checking your blood sugar? One time daily    What time of day are you checking your blood sugars (select all that apply)?  Times varies    Have you had any blood sugars above 200?  No    Have you had any blood sugars below 70?  No    What symptoms do you notice when your blood sugar is low?  None    What concerns do you have today about your diabetes? Other: ? Related to diabetes pt. States he gets the sweats X 1 month off and on     Do you have any of these symptoms? (Select all that apply)  No numbness or tingling in feet.  No redness, sores or blisters on feet.  No complaints of excessive thirst.  No reports of blurry vision.  No significant changes to weight.     Have you had a diabetic eye exam in the last 12 months? Yes- Date of last eye exam: spring of 2019    BP Readings from Last 2 Encounters:   07/30/19 112/70   12/18/18 136/73     Hemoglobin A1C (%)   Date Value   10/26/2018 7.5 (H)   06/12/2017 11.7 (H)     LDL Cholesterol Calculated (mg/dL)   Date Value   10/26/2018 82   11/16/2015 69       Diabetes Management Resources    Amount of exercise or physical activity: very active at work    Problems taking medications regularly: No    Medication side effects: none    Diet: regular (no restrictions)                Reviewed and updated as needed this visit by Provider         Review of Systems         Objective    /70   Pulse 80   Temp 98.7  F (37.1  C) (Tympanic)   Resp 16   Ht 1.892 m (6' 2.5\")   Wt (!) 169.4 kg (373 lb 6.4 oz)   SpO2 98%   BMI 47.30 kg/m    Body mass index is 47.3 kg/m .  Physical Exam               OBJECTIVE:  /70   Pulse 80   Temp 98.7  F (37.1  C) (Tympanic)   Resp 16   Ht 1.892 m (6' 2.5\")   Wt (!) 169.4 kg (373 lb 6.4 oz)   SpO2 98%   BMI 47.30 kg/m    LUNGS: clear to auscultation, normal breath " sounds  CV: RRR without murmur  ABD: BS+, soft, nontender, no masses, no hepatosplenomegaly  EXTREMITIES: without joint tenderness, swelling or erythema.  No muscle tenderness or abnormality.  SKIN: No rashes or abnormalities  NEURO:non focal exam    ASSESSMENT:  Type 2 diabetes mellitus without complication, with long-term current use of insulin (H)    PLAN:  Orders Placed This Encounter     Hemoglobin A1c

## 2019-08-12 ENCOUNTER — TELEPHONE (OUTPATIENT)
Dept: FAMILY MEDICINE | Facility: CLINIC | Age: 39
End: 2019-08-12

## 2019-08-12 DIAGNOSIS — E11.65 TYPE 2 DIABETES MELLITUS WITH HYPERGLYCEMIA, WITHOUT LONG-TERM CURRENT USE OF INSULIN (H): ICD-10-CM

## 2019-08-13 NOTE — TELEPHONE ENCOUNTER
Incoming fax for the same issue. Lantus is now a preferred products. Please expedite.  Darling Hodge  [facility] [place] [facility]

## 2019-10-13 DIAGNOSIS — K21.9 GASTROESOPHAGEAL REFLUX DISEASE, ESOPHAGITIS PRESENCE NOT SPECIFIED: ICD-10-CM

## 2019-10-13 NOTE — TELEPHONE ENCOUNTER
"Requested Prescriptions   Pending Prescriptions Disp Refills     pantoprazole (PROTONIX) 40 MG EC tablet [Pharmacy Med Name: PANTOPRAZOLE SODIUM 40 MG TABLET DR]  Last Written Prescription Date:  09/23/19  Last Fill Quantity: 90,  # refills: 3   Last office visit: 7/30/2019 with prescribing provider:  CLEMENTE Carbajal   Future Office Visit:     90 tablet 3     Sig: Take 1 tablet (40 mg) by mouth daily Take 30-60 minutes before a meal.       PPI Protocol Passed - 10/13/2019  8:00 AM        Passed - Not on Clopidogrel (unless Pantoprazole ordered)        Passed - No diagnosis of osteoporosis on record        Passed - Recent (12 mo) or future (30 days) visit within the authorizing provider's specialty     Patient has had an office visit with the authorizing provider or a provider within the authorizing providers department within the previous 12 mos or has a future within next 30 days. See \"Patient Info\" tab in inbasket, or \"Choose Columns\" in Meds & Orders section of the refill encounter.              Passed - Medication is active on med list        Passed - Patient is age 18 or older          "

## 2019-10-16 RX ORDER — PANTOPRAZOLE SODIUM 40 MG/1
40 TABLET, DELAYED RELEASE ORAL DAILY
Qty: 90 TABLET | Refills: 2 | Status: SHIPPED | OUTPATIENT
Start: 2019-10-16 | End: 2020-07-13

## 2019-10-17 DIAGNOSIS — E11.65 TYPE 2 DIABETES MELLITUS WITH HYPERGLYCEMIA, WITHOUT LONG-TERM CURRENT USE OF INSULIN (H): ICD-10-CM

## 2019-10-17 NOTE — TELEPHONE ENCOUNTER
"Requested Prescriptions   Pending Prescriptions Disp Refills     LANTUS SOLOSTAR 100 UNIT/ML soln [Pharmacy Med Name: LANTUS  Last Written Prescription Date:  08/13/19  Last Fill Quantity: 45ml,  # refills: 1   Last office visit: 7/30/2019 with prescribing provider:  Baltazar Carbajal   Future Office Visit:     SOLOSTAR 100/ML (3) INSULN PEN] 45 mL 1     Sig: Inject 65 Units Subcutaneous 2 times daily       Long Acting Insulin Protocol Passed - 10/17/2019  8:00 AM        Passed - Blood pressure less than 140/90 in past 6 months     BP Readings from Last 3 Encounters:   07/30/19 112/70   12/18/18 136/73   10/26/18 138/88           Passed - LDL on file in past 12 months     Recent Labs   Lab Test 10/26/18  1039   LDL 82           Passed - Microalbumin on file in past 12 months     Recent Labs   Lab Test 10/26/18  1039   MICROL 5   UMALCR 3.87           Passed - Serum creatinine on file in past 12 months     Recent Labs   Lab Test 12/18/18  1807   CR 0.90           Passed - HgbA1C in past 3 or 6 months     If HgbA1C is 8 or greater, it needs to be on file within the past 3 months.  If less than 8, must be on file within the past 6 months.     Recent Labs   Lab Test 07/30/19  1014   A1C 6.2*           Passed - Medication is active on med list        Passed - Patient is age 18 or older        Passed - Recent (6 mo) or future (30 days) visit within the authorizing provider's specialty     Patient had office visit in the last 6 months or has a visit in the next 30 days with authorizing provider or within the authorizing provider's specialty.  See \"Patient Info\" tab in inbasket, or \"Choose Columns\" in Meds & Orders section of the refill encounter.              "

## 2019-11-12 DIAGNOSIS — E11.9 TYPE 2 DIABETES MELLITUS WITHOUT COMPLICATION, WITH LONG-TERM CURRENT USE OF INSULIN (H): ICD-10-CM

## 2019-11-12 DIAGNOSIS — Z79.4 TYPE 2 DIABETES MELLITUS WITHOUT COMPLICATION, WITH LONG-TERM CURRENT USE OF INSULIN (H): ICD-10-CM

## 2019-11-12 NOTE — TELEPHONE ENCOUNTER
"Requested Prescriptions   Pending Prescriptions Disp Refills     atorvastatin (LIPITOR) 40 MG tablet [Pharmacy Med Name: ATORVASTATIN CALCIUM 40 MG TABLET] 90 tablet 3     Sig: Take 1 tablet (40 mg) by mouth daily       Statins Protocol Failed - 11/12/2019  8:00 AM        Failed - LDL on file in past 12 months     Recent Labs   Lab Test 10/26/18  1039   LDL 82             Passed - No abnormal creatine kinase in past 12 months     Recent Labs   Lab Test 12/22/15  2125                   Passed - Recent (12 mo) or future (30 days) visit within the authorizing provider's specialty     Patient has had an office visit with the authorizing provider or a provider within the authorizing providers department within the previous 12 mos or has a future within next 30 days. See \"Patient Info\" tab in inbasket, or \"Choose Columns\" in Meds & Orders section of the refill encounter.              Passed - Medication is active on med list        Passed - Patient is age 18 or older        Last Written Prescription Date:  10/26/2018  Last Fill Quantity: 90,  # refills: 3   Last office visit: 7/30/2019 with prescribing provider:  Solomon  Future Office Visit:      "

## 2019-11-12 NOTE — LETTER
[facility]  [address]  [facility] [address]  Phone: [phone]       November 14, 2019         Ghanshyam Suggs  [address] [facility] [place] [address]            Dear Ghanshyam:    We are concerned about your health care.  We recently provided you with medication refills.  Many medications require routine follow-up with your doctor and routine lab.    At this time, you are due for fasting labs. Please contact the clinic to schedule a Lab Only appointment.     Your prescription(s) have been refilled for 30 days so you may have time for the above noted follow-up. Please call to schedule soon so we can assure you have an appointment before your next refills are needed.    Thank you,      Baltazar Carbajal MD/ [place]

## 2019-11-14 RX ORDER — ATORVASTATIN CALCIUM 40 MG/1
40 TABLET, FILM COATED ORAL DAILY
Qty: 30 TABLET | Refills: 0 | Status: SHIPPED | OUTPATIENT
Start: 2019-11-14 | End: 2019-12-20

## 2019-11-18 DIAGNOSIS — E11.9 TYPE 2 DIABETES MELLITUS WITH TARGET HEMOGLOBIN A1C OF LESS THAN 8.0 PERCENT (H): ICD-10-CM

## 2019-11-18 NOTE — TELEPHONE ENCOUNTER
"Requested Prescriptions   Pending Prescriptions Disp Refills     insulin pen needle (ULTICARE SHORT) 31G X 8 MM miscellaneous [Pharmacy Med Name: ULTICARE PEN NEEDLE 31 GX5/16\" DIS NEEDLE]       Si device daily.       Diabetic Supplies Protocol Failed - 2019  8:00 AM        Failed - Medication is active on med list        Passed - Patient is 18 years of age or older        Passed - Recent (6 mo) or future (30 days) visit within the authorizing provider's specialty     Patient had office visit in the last 6 months or has a visit in the next 30 days with authorizing provider.  See \"Patient Info\" tab in inbasket, or \"Choose Columns\" in Meds & Orders section of the refill encounter.            Last Written Prescription Date:  5/15/2019  Last Fill Quantity: 1 box,  # refills: 1   Last office visit: 2019 with prescribing provider:  Solomon   Future Office Visit:      "

## 2019-11-19 NOTE — TELEPHONE ENCOUNTER
Prescription approved per [facility] [place].  Reminder of appt DUE Feb 2020.  KPavelRN

## 2019-12-19 DIAGNOSIS — E11.9 TYPE 2 DIABETES MELLITUS WITHOUT COMPLICATION, WITH LONG-TERM CURRENT USE OF INSULIN (H): ICD-10-CM

## 2019-12-19 DIAGNOSIS — Z79.4 TYPE 2 DIABETES MELLITUS WITHOUT COMPLICATION, WITH LONG-TERM CURRENT USE OF INSULIN (H): ICD-10-CM

## 2019-12-20 RX ORDER — ATORVASTATIN CALCIUM 40 MG/1
40 TABLET, FILM COATED ORAL DAILY
Qty: 30 TABLET | Refills: 0 | Status: SHIPPED | OUTPATIENT
Start: 2019-12-20 | End: 2020-01-21

## 2019-12-20 NOTE — TELEPHONE ENCOUNTER
Routing refill request to provider for review/approval because: [place] given x1 and patient did not follow up, please advise  Labs not current:  Lipids   Last OV 7/30/19: Return in about 6 months (around 1/30/2020).       Lydia VILLA RN

## 2019-12-20 NOTE — TELEPHONE ENCOUNTER
"Requested Prescriptions   Pending Prescriptions Disp Refills     atorvastatin (LIPITOR) 40 MG tablet [Pharmacy Med Name: ATORVASTATIN CALCIUM 40 MG TABLET] 30 tablet 0     Sig: Take 1 tablet (40 mg) by mouth daily       Statins Protocol Failed - 12/19/2019  5:15 PM        Failed - LDL on file in past 12 months     Recent Labs   Lab Test 10/26/18  1039   LDL 82             Passed - No abnormal creatine kinase in past 12 months     Recent Labs   Lab Test 12/22/15  2125                   Passed - Recent (12 mo) or future (30 days) visit within the authorizing provider's specialty     Patient has had an office visit with the authorizing provider or a provider within the authorizing providers department within the previous 12 mos or has a future within next 30 days. See \"Patient Info\" tab in inbasket, or \"Choose Columns\" in Meds & Orders section of the refill encounter.              Passed - Medication is active on med list        Passed - Patient is age 18 or older        Last Written Prescription Date:  11/14/2019  Last Fill Quantity: 30,  # refills: 0   Last office visit: 7/30/2019 with prescribing provider:  Solomon  Future Office Visit:      "

## 2020-01-13 DIAGNOSIS — E11.65 TYPE 2 DIABETES MELLITUS WITH HYPERGLYCEMIA, WITHOUT LONG-TERM CURRENT USE OF INSULIN (H): ICD-10-CM

## 2020-01-13 NOTE — TELEPHONE ENCOUNTER
"Requested Prescriptions   Pending Prescriptions Disp Refills     insulin glargine (LANTUS SOLOSTAR) 100 UNIT/ML pen 45 mL 2     Sig: Inject 65 Units Subcutaneous 2 times daily       Long Acting Insulin Protocol Failed - 1/13/2020  5:14 PM        Failed - LDL on file in past 12 months     Recent Labs   Lab Test 10/26/18  1039   LDL 82             Failed - Serum creatinine on file in past 12 months     Recent Labs   Lab Test 12/18/18  1807   CR 0.90             Passed - Blood pressure less than 140/90 in past 6 months     BP Readings from Last 3 Encounters:   07/30/19 112/70   12/18/18 136/73   10/26/18 138/88                 Passed - Microalbumin on file in past 12 months     Recent Labs   Lab Test 10/26/18  1039   MICROL 5   UMALCR 3.87             Passed - HgbA1C in past 3 or 6 months     If HgbA1C is 8 or greater, it needs to be on file within the past 3 months.  If less than 8, must be on file within the past 6 months.     Recent Labs   Lab Test 07/30/19  1014   A1C 6.2*             Passed - Medication is active on med list        Passed - Patient is age 18 or older        Passed - Recent (6 mo) or future (30 days) visit within the authorizing provider's specialty     Patient had office visit in the last 6 months or has a visit in the next 30 days with authorizing provider or within the authorizing provider's specialty.  See \"Patient Info\" tab in inbasket, or \"Choose Columns\" in Meds & Orders section of the refill encounter.            Last Written Prescription Date:  10/18/2019  Last Fill Quantity: 45ml,  # refills: 2   Last office visit: 7/30/2019 with prescribing provider:  Solomon   Future Office Visit:      "

## 2020-01-20 DIAGNOSIS — Z79.4 TYPE 2 DIABETES MELLITUS WITHOUT COMPLICATION, WITH LONG-TERM CURRENT USE OF INSULIN (H): ICD-10-CM

## 2020-01-20 DIAGNOSIS — E11.9 TYPE 2 DIABETES MELLITUS WITHOUT COMPLICATION, WITH LONG-TERM CURRENT USE OF INSULIN (H): ICD-10-CM

## 2020-01-21 RX ORDER — ATORVASTATIN CALCIUM 40 MG/1
40 TABLET, FILM COATED ORAL DAILY
Qty: 90 TABLET | Refills: 0 | Status: SHIPPED | OUTPATIENT
Start: 2020-01-21

## 2020-01-21 NOTE — TELEPHONE ENCOUNTER
"Requested Prescriptions   Pending Prescriptions Disp Refills     atorvastatin (LIPITOR) 40 MG tablet [Pharmacy Med Name: ATORVASTATIN  Last Written Prescription Date:  12/20/19  Last Fill Quantity: 30,  # refills: 0   Last office visit: 7/30/2019 with prescribing provider:  Baltazar Carbajal   Future Office Visit:     CALCIUM 40 MG TABLET] 30 tablet 0     Sig: Take 1 tablet (40 mg) by mouth daily       Statins Protocol Failed - 1/20/2020  6:57 PM        Failed - LDL on file in past 12 months     Recent Labs   Lab Test 10/26/18  1039   LDL 82           Passed - No abnormal creatine kinase in past 12 months     Recent Labs   Lab Test 12/22/15  2125              Passed - Recent (12 mo) or future (30 days) visit within the authorizing provider's specialty     Patient has had an office visit with the authorizing provider or a provider within the authorizing providers department within the previous 12 mos or has a future within next 30 days. See \"Patient Info\" tab in inbasket, or \"Choose Columns\" in Meds & Orders section of the refill encounter.          Passed - Medication is active on med list        Passed - Patient is age 18 or older          "

## 2020-01-21 NOTE — TELEPHONE ENCOUNTER
Prescription [place] per [facility] [place].  Reminder to do Cholesterol labs. KPavelRN

## 2020-03-13 ENCOUNTER — TELEPHONE (OUTPATIENT)
Dept: FAMILY MEDICINE | Facility: CLINIC | Age: 40
End: 2020-03-13

## 2020-03-13 NOTE — TELEPHONE ENCOUNTER
Left message for patient to return call to clinic.  Is he experiencing symptoms of UTI?    Bibi CHAMPION RN

## 2020-03-13 NOTE — TELEPHONE ENCOUNTER
Patient reports about a year ago he had similar symptoms - prostatitis.  Penis is painful, tender to touch, tip of penis red, yellow discharge.  Symptoms started 2 days ago.  RN advised UC for evaluation - no clinic openings.  Patient declines states, he will buy AZO OTC and use up last of cipro from last time.  RN advised against this.  He understands and will decide what to do.    Bibi CHAMPION RN

## 2020-03-13 NOTE — TELEPHONE ENCOUNTER
Reason for Call:  Other prescription    Detailed comments: Patient is calling to request medication. Phenazopyridine and ciprofloxacin. Please call patient to discuss     Phone Number Patient can be reached at: Cell number on file:    Telephone Information:   Mobile [phone]       Best Time: anytime     Can we leave a detailed message on this number? YES    Call taken on 3/13/2020 at 12:23 PM by Karen Liriano

## 2020-04-08 DIAGNOSIS — E11.9 TYPE 2 DIABETES MELLITUS WITH TARGET HEMOGLOBIN A1C OF LESS THAN 8.0 PERCENT (H): ICD-10-CM

## 2020-04-08 RX ORDER — METHYLPREDNISOLONE SODIUM SUCCINATE 125 MG/2ML
INJECTION, POWDER, FOR SOLUTION INTRAMUSCULAR; INTRAVENOUS
Qty: 60 EACH | Refills: 1 | Status: SHIPPED | OUTPATIENT
Start: 2020-04-08 | End: 2020-07-08

## 2020-04-13 DIAGNOSIS — E11.65 TYPE 2 DIABETES MELLITUS WITH HYPERGLYCEMIA, WITHOUT LONG-TERM CURRENT USE OF INSULIN (H): ICD-10-CM

## 2020-04-13 NOTE — TELEPHONE ENCOUNTER
"Requested Prescriptions   Pending Prescriptions Disp Refills     LANTUS SOLOSTAR 100 UNIT/ML soln [Pharmacy Med Name: Lantus Solostar U-100 Insulin 100 unit/mL (3 mL) subcutaneous pen]  Last Written Prescription Date:  1/14/20  Last Fill Quantity: 45,  # refills: 2   Last Office Visit with [facility] or [facility] prescribing provider:  7/30/19   Future Office Visit:      45 mL 2     Sig: Inject 65 Units Subcutaneous 2 times daily       Long Acting Insulin Protocol Failed - 4/13/2020  8:00 AM        Failed - Serum creatinine on file in past 12 months     Recent Labs   Lab Test 12/18/18  1807   CR 0.90       Ok to refill medication if creatinine is low          Failed - HgbA1C in past 3 or 6 months     If HgbA1C is 8 or greater, it needs to be on file within the past 3 months.  If less than 8, must be on file within the past 6 months.     Recent Labs   Lab Test 07/30/19  1014   A1C 6.2*             Failed - Recent (6 mo) or future (30 days) visit within the authorizing provider's specialty     Patient had office visit in the last 6 months or has a visit in the next 30 days with authorizing provider or within the authorizing provider's specialty.  See \"Patient Info\" tab in inbasket, or \"Choose Columns\" in Meds & Orders section of the refill encounter.            Passed - Medication is active on med list        Passed - Patient is age 18 or older             "

## 2020-07-02 DIAGNOSIS — E11.65 TYPE 2 DIABETES MELLITUS WITH HYPERGLYCEMIA, WITHOUT LONG-TERM CURRENT USE OF INSULIN (H): ICD-10-CM

## 2020-07-03 RX ORDER — INSULIN GLARGINE 100 [IU]/ML
INJECTION, SOLUTION SUBCUTANEOUS
Qty: 45 ML | Refills: 2 | Status: SHIPPED | OUTPATIENT
Start: 2020-07-03 | End: 2020-10-16

## 2020-07-03 NOTE — TELEPHONE ENCOUNTER
"Routing refill request to provider for review/approval because:  Labs not current:  A1C, Cr        Requested Prescriptions   Pending Prescriptions Disp Refills     LANTUS SOLOSTAR 100 UNIT/ML soln [Pharmacy Med Name: Lantus Solostar U-100 Insulin 100 unit/mL (3 mL) subcutaneous pen] 45 mL 2     Sig: Inject 65 Units Subcutaneous 2 times daily Appt and A1C DUE July 2020       Long Acting Insulin Protocol Failed - 7/2/2020 12:28 PM        Failed - Serum creatinine on file in past 12 months     Recent Labs   Lab Test 12/18/18  1807   CR 0.90       Ok to refill medication if creatinine is low          Failed - HgbA1C in past 3 or 6 months     If HgbA1C is 8 or greater, it needs to be on file within the past 3 months.  If less than 8, must be on file within the past 6 months.     Recent Labs   Lab Test 07/30/19  1014   A1C 6.2*             Failed - Recent (6 mo) or future (30 days) visit within the authorizing provider's specialty     Patient had office visit in the last 6 months or has a visit in the next 30 days with authorizing provider or within the authorizing provider's specialty.  See \"Patient Info\" tab in inbasket, or \"Choose Columns\" in Meds & Orders section of the refill encounter.            Passed - Medication is active on med list        Passed - Patient is age 18 or older             "

## 2020-07-06 DIAGNOSIS — E11.9 TYPE 2 DIABETES MELLITUS WITH TARGET HEMOGLOBIN A1C OF LESS THAN 8.0 PERCENT (H): ICD-10-CM

## 2020-07-06 NOTE — TELEPHONE ENCOUNTER
Last Written Prescription Date: Ulticare Pen [place] short 4/8/2020   Last Fill Quantity: 60,  # refills: 1   Last office visit: 7/30/2019 with prescribing provider:  Solomon   Future Office Visit:      Darling Hughes  [facility]

## 2020-07-07 NOTE — TELEPHONE ENCOUNTER
"Requested Prescriptions   Pending Prescriptions Disp Refills     insulin pen needle (ULTICARE SHORT) 31G X 8 MM miscellaneous 60 each 1     Si device daily. Appt DUE 2020       Diabetic Supplies Protocol Failed - 2020 10:33 AM        Failed - Recent (6 mo) or future (30 days) visit within the authorizing provider's specialty     Patient had office visit in the last 6 months or has a visit in the next 30 days with authorizing provider.  See \"Patient Info\" tab in inbasket, or \"Choose Columns\" in Meds & Orders section of the refill encounter.            Passed - Medication is active on med list        Passed - Patient is 18 years of age or older             "

## 2020-07-08 RX ORDER — METHYLPREDNISOLONE SODIUM SUCCINATE 125 MG/2ML
INJECTION, POWDER, FOR SOLUTION INTRAMUSCULAR; INTRAVENOUS
Qty: 60 EACH | Refills: 0 | Status: SHIPPED | OUTPATIENT
Start: 2020-07-08 | End: 2020-11-20

## 2020-07-08 NOTE — TELEPHONE ENCOUNTER
Medication is being filled for 1 time refill only due to:  Patient needs to be seen because due for DM check.

## 2020-07-12 DIAGNOSIS — K21.9 GASTROESOPHAGEAL REFLUX DISEASE, ESOPHAGITIS PRESENCE NOT SPECIFIED: ICD-10-CM

## 2020-07-13 RX ORDER — PANTOPRAZOLE SODIUM 40 MG/1
40 TABLET, DELAYED RELEASE ORAL DAILY
Qty: 90 TABLET | Refills: 0 | Status: SHIPPED | OUTPATIENT
Start: 2020-07-13 | End: 2021-01-20

## 2020-10-16 ENCOUNTER — TELEPHONE (OUTPATIENT)
Dept: FAMILY MEDICINE | Facility: CLINIC | Age: 40
End: 2020-10-16

## 2020-10-16 DIAGNOSIS — E11.65 TYPE 2 DIABETES MELLITUS WITH HYPERGLYCEMIA, WITHOUT LONG-TERM CURRENT USE OF INSULIN (H): ICD-10-CM

## 2020-10-16 RX ORDER — INSULIN GLARGINE 100 [IU]/ML
INJECTION, SOLUTION SUBCUTANEOUS
Qty: 6 ML | Refills: 0 | Status: SHIPPED | OUTPATIENT
Start: 2020-10-16 | End: 2020-11-20

## 2020-10-16 NOTE — TELEPHONE ENCOUNTER
Filled Rx for 1 month. Patient is due for annual office visit, please notify. Thank you. ABDIFATAH Lara CNP

## 2020-10-16 NOTE — TELEPHONE ENCOUNTER
"Requested Prescriptions   Pending Prescriptions Disp Refills     LANTUS SOLOSTAR 100 UNIT/ML soln [Pharmacy Med Name: Lantus Solostar U-100 Insulin 100 unit/mL (3 mL) subcutaneous pen] 45 mL 2     Sig: Inject 65 Units Subcutaneous 2 times daily Appt and A1C DUE July 2020       Long Acting Insulin Protocol Failed - 10/16/2020  8:00 AM        Failed - Serum creatinine on file in past 12 months     Recent Labs   Lab Test 12/18/18  1807   CR 0.90       Ok to refill medication if creatinine is low          Failed - HgbA1C in past 3 or 6 months     If HgbA1C is 8 or greater, it needs to be on file within the past 3 months.  If less than 8, must be on file within the past 6 months.     Recent Labs   Lab Test 07/30/19  1014   A1C 6.2*             Failed - Recent (6 mo) or future (30 days) visit within the authorizing provider's specialty     Patient had office visit in the last 6 months or has a visit in the next 30 days with authorizing provider or within the authorizing provider's specialty.  See \"Patient Info\" tab in inbasket, or \"Choose Columns\" in Meds & Orders section of the refill encounter.            Passed - Medication is active on med list        Passed - Patient is age 18 or older             "

## 2020-10-24 DIAGNOSIS — E11.65 TYPE 2 DIABETES MELLITUS WITH HYPERGLYCEMIA (H): ICD-10-CM

## 2020-10-26 NOTE — TELEPHONE ENCOUNTER
"Requested Prescriptions   Pending Prescriptions Disp Refills     CONTOUR TEST test strip [Pharmacy Med Name: Contour Test Strips] 100 strip 3     Sig: USE TO TEST BLOOD SUGAR 4 TIMES DAILY OR AS DIRECT ED. (GETS ONE TOUCH ULTRA)       Diabetic Supplies Protocol Failed - 10/24/2020  9:40 AM        Failed - Recent (6 mo) or future (30 days) visit within the authorizing provider's specialty     Patient had office visit in the last 6 months or has a visit in the next 30 days with authorizing provider.  See \"Patient Info\" tab in inbasket, or \"Choose Columns\" in Meds & Orders section of the refill encounter.            Passed - Medication is active on med list        Passed - Patient is 18 years of age or older             "

## 2020-10-27 RX ORDER — CARVEDILOL 25 MG/1
TABLET, FILM COATED ORAL
Qty: 100 STRIP | Refills: 0 | Status: SHIPPED | OUTPATIENT
Start: 2020-10-27 | End: 2020-11-20

## 2020-12-17 DIAGNOSIS — E11.65 TYPE 2 DIABETES MELLITUS WITH HYPERGLYCEMIA, WITHOUT LONG-TERM CURRENT USE OF INSULIN (H): ICD-10-CM

## 2020-12-17 NOTE — TELEPHONE ENCOUNTER
"Requested Prescriptions   Pending Prescriptions Disp Refills     LANTUS SOLOSTAR 100 UNIT/ML soln [Pharmacy Med Name: Lantus Solostar U-100 Insulin 100 unit/mL (3 mL) subcutaneous pen] 45 mL 0     Sig: give 65units twice APPLY DAY       Long Acting Insulin Protocol Failed - 12/17/2020  7:54 AM        Failed - Serum creatinine on file in past 12 months     Recent Labs   Lab Test 12/18/18  1807   CR 0.90       Ok to refill medication if creatinine is low          Failed - HgbA1C in past 3 or 6 months     If HgbA1C is 8 or greater, it needs to be on file within the past 3 months.  If less than 8, must be on file within the past 6 months.     Recent Labs   Lab Test 07/30/19  1014   A1C 6.2*             Failed - Recent (6 mo) or future (30 days) visit within the authorizing provider's specialty     Patient had office visit in the last 6 months or has a visit in the next 30 days with authorizing provider or within the authorizing provider's specialty.  See \"Patient Info\" tab in inbasket, or \"Choose Columns\" in Meds & Orders section of the refill encounter.            Passed - Medication is active on med list        Passed - Patient is age 18 or older             "

## 2020-12-22 DIAGNOSIS — E11.65 TYPE 2 DIABETES MELLITUS WITH HYPERGLYCEMIA, WITHOUT LONG-TERM CURRENT USE OF INSULIN (H): Primary | ICD-10-CM

## 2020-12-22 NOTE — TELEPHONE ENCOUNTER
Routing refill request to provider for review/approval because:  Labs not current:  Last A1C of 6.2 on 7/30/19. Creat done 12/18/18.  Last seen 7/30/19.  Advise.  Arslan

## 2020-12-23 RX ORDER — INSULIN GLARGINE 100 [IU]/ML
INJECTION, SOLUTION SUBCUTANEOUS
Qty: 45 ML | Refills: 0 | Status: SHIPPED | OUTPATIENT
Start: 2020-12-23 | End: 2021-02-26

## 2020-12-29 RX ORDER — BLOOD-GLUCOSE METER
EACH MISCELLANEOUS
Qty: 1 KIT | Refills: 0 | Status: SHIPPED | OUTPATIENT
Start: 2020-12-29 | End: 2021-07-27

## 2021-02-22 DIAGNOSIS — E11.65 TYPE 2 DIABETES MELLITUS WITH HYPERGLYCEMIA, WITHOUT LONG-TERM CURRENT USE OF INSULIN (H): ICD-10-CM

## 2021-02-22 NOTE — TELEPHONE ENCOUNTER
"Requested Prescriptions   Pending Prescriptions Disp Refills     LANTUS SOLOSTAR 100 UNIT/ML soln [Pharmacy Med Name: Lantus Solostar U-100 Insulin 100 unit/mL (3 mL) subcutaneous pen] 45 mL 0     Siunit TWICE DAILY       Long Acting Insulin Protocol Failed - 2021  5:46 AM        Failed - Serum creatinine on file in past 12 months     Recent Labs   Lab Test 18  1807   CR 0.90       Ok to refill medication if creatinine is low          Failed - HgbA1C in past 3 or 6 months     If HgbA1C is 8 or greater, it needs to be on file within the past 3 months.  If less than 8, must be on file within the past 6 months.     Recent Labs   Lab Test 19  1014   A1C 6.2*             Failed - Recent (6 mo) or future (30 days) visit within the authorizing provider's specialty     Patient had office visit in the last 6 months or has a visit in the next 30 days with authorizing provider or within the authorizing provider's specialty.  See \"Patient Info\" tab in inbasket, or \"Choose Columns\" in Meds & Orders section of the refill encounter.            Passed - Medication is active on med list        Passed - Patient is age 18 or older             "

## 2021-02-26 RX ORDER — INSULIN GLARGINE 100 [IU]/ML
INJECTION, SOLUTION SUBCUTANEOUS
Qty: 45 ML | Refills: 0 | Status: SHIPPED | OUTPATIENT
Start: 2021-02-26 | End: 2021-04-27

## 2021-04-08 ENCOUNTER — TELEPHONE (OUTPATIENT)
Dept: FAMILY MEDICINE | Facility: CLINIC | Age: 41
End: 2021-04-08

## 2021-04-08 DIAGNOSIS — E11.9 TYPE 2 DIABETES MELLITUS WITH TARGET HEMOGLOBIN A1C OF LESS THAN 8.0 PERCENT (H): ICD-10-CM

## 2021-04-08 RX ORDER — METHYLPREDNISOLONE SODIUM SUCCINATE 125 MG/2ML
INJECTION, POWDER, FOR SOLUTION INTRAMUSCULAR; INTRAVENOUS
Qty: 30 EACH | Refills: 0 | Status: SHIPPED | OUTPATIENT
Start: 2021-04-08 | End: 2021-06-23

## 2021-04-08 NOTE — TELEPHONE ENCOUNTER
"Ulticare Pen Needle 31 gauge x 5/16\"  Last Written Prescription Date:  11/20/20  Last Fill Quantity: 30,  # refills: 11   Last office visit: 7/30/2019 with prescribing provider:  annmarie   Future Office Visit:            "

## 2021-04-08 NOTE — TELEPHONE ENCOUNTER
Routing refill request to provider for review/approval because:  Patient needs to be seen because it has been more than 1 year since last office visit.  Last office visit 7/30/19    Barbra Allison RN

## 2021-04-23 DIAGNOSIS — E11.65 TYPE 2 DIABETES MELLITUS WITH HYPERGLYCEMIA, WITHOUT LONG-TERM CURRENT USE OF INSULIN (H): ICD-10-CM

## 2021-04-23 NOTE — TELEPHONE ENCOUNTER
Lantus Solostar U-100 Insulin 100 unit/mL (3 mL) subcutaneous pen  Last Written Prescription Date:  2/26/21  Last Fill Quantity: 45ml,  # refills: 0   Last office visit: 7/30/2019 with prescribing provider:  annmarie   Future Office Visit:

## 2021-04-27 RX ORDER — INSULIN GLARGINE 100 [IU]/ML
INJECTION, SOLUTION SUBCUTANEOUS
Qty: 45 ML | Refills: 0 | Status: SHIPPED | OUTPATIENT
Start: 2021-04-27 | End: 2021-07-09

## 2021-04-27 NOTE — TELEPHONE ENCOUNTER
Medication is being filled for 1 time refill only due to:  PAtient will need annual visit and lab prior to further refill. Message left with is advise.     Galina AGUILAR RN

## 2021-06-21 DIAGNOSIS — E11.9 TYPE 2 DIABETES MELLITUS WITH TARGET HEMOGLOBIN A1C OF LESS THAN 8.0 PERCENT (H): ICD-10-CM

## 2021-06-23 NOTE — TELEPHONE ENCOUNTER
Patient has not been seen since 2019.  Appointment has been made for 7/9/21.    Ok to fill x 30 days?    Daly Ceja RN

## 2021-06-24 RX ORDER — METHYLPREDNISOLONE SODIUM SUCCINATE 125 MG/2ML
INJECTION, POWDER, FOR SOLUTION INTRAMUSCULAR; INTRAVENOUS
Qty: 30 EACH | Refills: 0 | Status: SHIPPED | OUTPATIENT
Start: 2021-06-24 | End: 2022-08-24

## 2021-07-09 ENCOUNTER — OFFICE VISIT (OUTPATIENT)
Dept: FAMILY MEDICINE | Facility: CLINIC | Age: 41
End: 2021-07-09
Payer: COMMERCIAL

## 2021-07-09 VITALS
TEMPERATURE: 97 F | OXYGEN SATURATION: 97 % | BODY MASS INDEX: 39.17 KG/M2 | WEIGHT: 315 LBS | DIASTOLIC BLOOD PRESSURE: 100 MMHG | HEART RATE: 84 BPM | RESPIRATION RATE: 16 BRPM | HEIGHT: 75 IN | SYSTOLIC BLOOD PRESSURE: 126 MMHG

## 2021-07-09 DIAGNOSIS — R53.82 CHRONIC FATIGUE: ICD-10-CM

## 2021-07-09 DIAGNOSIS — K21.9 GASTROESOPHAGEAL REFLUX DISEASE WITHOUT ESOPHAGITIS: ICD-10-CM

## 2021-07-09 DIAGNOSIS — E11.65 TYPE 2 DIABETES MELLITUS WITH HYPERGLYCEMIA, WITHOUT LONG-TERM CURRENT USE OF INSULIN (H): ICD-10-CM

## 2021-07-09 DIAGNOSIS — I10 BENIGN ESSENTIAL HYPERTENSION: ICD-10-CM

## 2021-07-09 DIAGNOSIS — E11.65 TYPE 2 DIABETES MELLITUS WITH HYPERGLYCEMIA, UNSPECIFIED WHETHER LONG TERM INSULIN USE (H): Primary | ICD-10-CM

## 2021-07-09 DIAGNOSIS — R81 GLUCOSURIA: ICD-10-CM

## 2021-07-09 DIAGNOSIS — Z76.89 ESTABLISHING CARE WITH NEW DOCTOR, ENCOUNTER FOR: ICD-10-CM

## 2021-07-09 LAB
ANION GAP SERPL CALCULATED.3IONS-SCNC: 4 MMOL/L (ref 3–14)
BUN SERPL-MCNC: 15 MG/DL (ref 7–30)
CALCIUM SERPL-MCNC: 8.9 MG/DL (ref 8.5–10.1)
CHLORIDE SERPL-SCNC: 112 MMOL/L (ref 94–109)
CHOLEST SERPL-MCNC: 161 MG/DL
CO2 SERPL-SCNC: 24 MMOL/L (ref 20–32)
CREAT SERPL-MCNC: 0.94 MG/DL (ref 0.66–1.25)
CREAT UR-MCNC: 168 MG/DL
GFR SERPL CREATININE-BSD FRML MDRD: >90 ML/MIN/{1.73_M2}
GLUCOSE SERPL-MCNC: 98 MG/DL (ref 70–99)
HBA1C MFR BLD: 6.6 % (ref 0–5.6)
HDLC SERPL-MCNC: 48 MG/DL
HGB BLD-MCNC: 16.4 G/DL (ref 13.3–17.7)
LDLC SERPL CALC-MCNC: 77 MG/DL
MICROALBUMIN UR-MCNC: 7 MG/L
MICROALBUMIN/CREAT UR: 4.21 MG/G CR (ref 0–17)
NONHDLC SERPL-MCNC: 113 MG/DL
POTASSIUM SERPL-SCNC: 4.2 MMOL/L (ref 3.4–5.3)
SODIUM SERPL-SCNC: 140 MMOL/L (ref 133–144)
TRIGL SERPL-MCNC: 181 MG/DL
TSH SERPL DL<=0.005 MIU/L-ACNC: 1.74 MU/L (ref 0.4–4)

## 2021-07-09 PROCEDURE — 80048 BASIC METABOLIC PNL TOTAL CA: CPT | Performed by: FAMILY MEDICINE

## 2021-07-09 PROCEDURE — 84443 ASSAY THYROID STIM HORMONE: CPT | Performed by: FAMILY MEDICINE

## 2021-07-09 PROCEDURE — 82043 UR ALBUMIN QUANTITATIVE: CPT | Performed by: FAMILY MEDICINE

## 2021-07-09 PROCEDURE — 85018 HEMOGLOBIN: CPT | Performed by: FAMILY MEDICINE

## 2021-07-09 PROCEDURE — 80061 LIPID PANEL: CPT | Performed by: FAMILY MEDICINE

## 2021-07-09 PROCEDURE — 83036 HEMOGLOBIN GLYCOSYLATED A1C: CPT | Performed by: FAMILY MEDICINE

## 2021-07-09 PROCEDURE — 99214 OFFICE O/P EST MOD 30 MIN: CPT | Performed by: FAMILY MEDICINE

## 2021-07-09 PROCEDURE — 36415 COLL VENOUS BLD VENIPUNCTURE: CPT | Performed by: FAMILY MEDICINE

## 2021-07-09 RX ORDER — PANTOPRAZOLE SODIUM 40 MG/1
80 TABLET, DELAYED RELEASE ORAL DAILY
Qty: 180 TABLET | Refills: 4 | Status: SHIPPED | OUTPATIENT
Start: 2021-07-09 | End: 2023-04-28

## 2021-07-09 RX ORDER — LISINOPRIL 20 MG/1
20 TABLET ORAL DAILY
Qty: 30 TABLET | Refills: 3 | Status: SHIPPED | OUTPATIENT
Start: 2021-07-09 | End: 2021-12-06

## 2021-07-09 ASSESSMENT — MIFFLIN-ST. JEOR: SCORE: 2663.47

## 2021-07-09 NOTE — LETTER
July 11, 2021      Ghanshyam Suggs  [address] [facility] [place] [address]        Dear ,    We are writing to inform you of your test results.    Your test results fall within the expected range(s) or remain unchanged from previous results.  Please continue with current treatment plan.    More specific comments:    General electrolytes - good. Chloride shows a little high but I consider this normal enough  Normal urine albumin and urine and blood creatinine means kidney function is good.  Hemoglobin (red blood level) and TSH (thyroid) - You do not have anemia and you do not have abnormal thyroid. I think a lot of your tiredness are more related to what we talked about during our visit.  Hemoglobin A1c 6.6% is a good level. Good control of your diabetes. No need for us to change meds.    Resulted Orders   Basic metabolic panel  (Ca, Cl, CO2, Creat, Gluc, K, Na, BUN)   Result Value Ref Range    Sodium 140 133 - 144 mmol/L    Potassium 4.2 3.4 - 5.3 mmol/L    Chloride 112 (H) 94 - 109 mmol/L    Carbon Dioxide 24 20 - 32 mmol/L    Anion Gap 4 3 - 14 mmol/L    Glucose 98 70 - 99 mg/dL      Comment:      Fasting specimen    Urea Nitrogen 15 7 - 30 mg/dL    Creatinine 0.94 0.66 - 1.25 mg/dL    GFR Estimate >90 >60 mL/min/[1.73_m2]      Comment:      Non  GFR Calc  Starting 12/18/2018, serum creatinine based estimated GFR (eGFR) will be   calculated using the Chronic Kidney Disease Epidemiology Collaboration   (CKD-EPI) equation.      GFR Estimate If Black >90 >60 mL/min/[1.73_m2]      Comment:       GFR Calc  Starting 12/18/2018, serum creatinine based estimated GFR (eGFR) will be   calculated using the Chronic Kidney Disease Epidemiology Collaboration   (CKD-EPI) equation.      Calcium 8.9 8.5 - 10.1 mg/dL   Hemoglobin A1c   Result Value Ref Range    Hemoglobin A1C 6.6 (H) 0 - 5.6 %      Comment:      Normal <5.7% Prediabetes 5.7-6.4%  Diabetes 6.5% or higher - adopted from ADA    consensus guidelines.     Lipid panel reflex to direct LDL Fasting   Result Value Ref Range    Cholesterol 161 <200 mg/dL    Triglycerides 181 (H) <150 mg/dL      Comment:      Borderline high:  150-199 mg/dl  High:             200-499 mg/dl  Very high:       >499 mg/dl  Fasting specimen      HDL Cholesterol 48 >39 mg/dL    LDL Cholesterol Calculated 77 <100 mg/dL      Comment:      Desirable:       <100 mg/dl    Non HDL Cholesterol 113 <130 mg/dL   Albumin Random Urine Quantitative with Creat Ratio   Result Value Ref Range    Creatinine Urine 168 mg/dL    Albumin Urine mg/L 7 mg/L    Albumin Urine mg/g Cr 4.21 0 - 17 mg/g Cr   Hemoglobin   Result Value Ref Range    Hemoglobin 16.4 13.3 - 17.7 g/dL   TSH with free T4 reflex   Result Value Ref Range    TSH 1.74 0.40 - 4.00 mU/L       If you have any questions or concerns, please call the clinic at the number listed above.       Sincerely,      Radha Ross MD

## 2021-07-09 NOTE — PROGRESS NOTES
Assessment & Plan     Type 2 diabetes mellitus with hyperglycemia, unspecified whether long term insulin use (H)  Unclear control. Good numbers in 2019 but reporting BGs 200s-300s at home, will check as below  - Basic metabolic panel  (Ca, Cl, CO2, Creat, Gluc, K, Na, BUN)  - Hemoglobin A1c  - Lipid panel reflex to direct LDL Fasting  - lisinopril (ZESTRIL) 20 MG tablet  Dispense: 30 tablet; Refill: 3  - Albumin Random Urine Quantitative with Creat Ratio  - metFORMIN (GLUCOPHAGE) 500 MG tablet  Dispense: 180 tablet; Refill: 4  - declined DM foot exam due to asymptomatic    Benign essential hypertension - new  Possible WILLIAN contribution since he was dx previously and had AHI 10s but patient declines sleep re-eval  - Home Blood Pressure Monitor Order for DME - ONLY FOR DME  - lisinopril (ZESTRIL) 20 MG tablet  Dispense: 30 tablet; Refill: 3    Type 2 diabetes mellitus with hyperglycemia, without long-term current use of insulin (H)  Refill only. No dose change but can change depending on the above  - insulin glargine (LANTUS SOLOSTAR) 100 UNIT/ML pen  Dispense: 45 mL; Refill: 11    Gastroesophageal reflux disease without esophagitis  Refill, increased to 80mg because he is already taking it that way  - pantoprazole (PROTONIX) 40 MG EC tablet  Dispense: 180 tablet; Refill: 4    Chronic fatigue  Again I think chronic stress, working long hours, WILLIAN, and chronic opioids contribute, but pt agreeable to the following checks.  - Hemoglobin  - TSH with free T4 reflex    Establishing care with new doctor, encounter for    Call and mail with results     Return if symptoms worsen or fail to improve, for Routine preventive.   After appointment patient reported he wanted to talk about his chronic shoulder pain and I told him to come back for a BP check and shoulder appt in ~2 weeks.    Radha Ross MD  [facility]          Jeana Morrissey is a 41 year old who presents for the following health issues      [facility]     Diabetes Follow-up    How often are you checking your blood sugar? A few times a week  What time of day are you checking your blood sugars (select all that apply)?  Before meals  Have you had any blood sugars above 200?  No  Have you had any blood sugars below 70?  No    What symptoms do you notice when your blood sugar is low?  Shaky and sweaty    What concerns do you have today about your diabetes? fatigue     Do you have any of these symptoms? (Select all that apply)  No numbness or tingling in feet.  No redness, sores or blisters on feet.  No complaints of excessive thirst.  No reports of blurry vision.  No significant changes to weight.    Have you had a diabetic eye exam in the last 12 months?     BP Readings from Last 2 Encounters:   07/09/21 (!) 126/100   07/30/19 112/70     Hemoglobin A1C (%)   Date Value   07/30/2019 6.2 (H)   10/26/2018 7.5 (H)     LDL Cholesterol Calculated (mg/dL)   Date Value   10/26/2018 82   11/16/2015 69       How many servings of fruits and vegetables do you eat daily?  2-3    On average, how many sweetened beverages do you drink each day (Examples: soda, juice, sweet tea, etc.  Do NOT count diet or artificially sweetened beverages)?   0    How many days per week do you exercise enough to make your heart beat faster? 3 or less    How many minutes a day do you exercise enough to make your heart beat faster? 30 - 60    How many days per week do you miss taking your medication? 0    Usually 200s-300s  Last week his blood pressure was 180s/112. Was told by his pain med dr that he needs to be on BP meds  As far as he knows, never been on any BP meds     Feels very tired  Feels he's sleeping well, deeply. Feels rested in the morning but then feels really tired during his drive to work and toward the end of the day  Going through divorce and court proceedings, very stressed  Long hours - 14hr shifts, reports it's not possible for him to try to cut back right now    Struggling  "with anxiety, asking for xanax refill because pain med said they can't provide it to him anymore  Pt feels he has been on it for years without ever needing an increased dose therefore why can't he get it refilled  Mainly bothered by the anxiety that gets in the way of his sleep. Has tried ambien in the past, feels useless.  Doesn't want to see mental health  Declines sleep re-evaluation    Review of Systems   Constitutional, HEENT, cardiovascular, pulmonary, gi and gu systems are negative, except as otherwise noted.        Objective    BP (!) 126/100 (BP Location: Right arm, Patient Position: Sitting, Cuff Size: Adult Large)   Pulse 84   Temp 97  F (36.1  C) (Tympanic)   Resp 16   Ht 1.905 m (6' 3\")   Wt (!) 168.3 kg (371 lb)   SpO2 97%   BMI 46.37 kg/m    Body mass index is 46.37 kg/m .  Physical Exam   GENERAL: healthy, alert and no distress  RESP: normal respiratory effort, speaking in complete sentences  MS: no gross musculoskeletal defects noted, no edema  PSYCH: mentation appears normal, affect normal              "

## 2021-07-26 ENCOUNTER — HOSPITAL ENCOUNTER (EMERGENCY)
Facility: CLINIC | Age: 41
Discharge: HOME | End: 2021-07-26
Attending: EMERGENCY MEDICINE | Admitting: EMERGENCY MEDICINE
Payer: COMMERCIAL

## 2021-07-26 ENCOUNTER — APPOINTMENT (OUTPATIENT)
Dept: CT IMAGING | Facility: CLINIC | Age: 41
End: 2021-07-26
Attending: EMERGENCY MEDICINE
Payer: COMMERCIAL

## 2021-07-26 ENCOUNTER — NURSE TRIAGE (OUTPATIENT)
Dept: NURSING | Facility: CLINIC | Age: 41
End: 2021-07-26

## 2021-07-26 VITALS
HEIGHT: 72 IN | DIASTOLIC BLOOD PRESSURE: 81 MMHG | WEIGHT: 315 LBS | HEART RATE: 62 BPM | SYSTOLIC BLOOD PRESSURE: 123 MMHG | TEMPERATURE: 98 F | BODY MASS INDEX: 42.66 KG/M2 | OXYGEN SATURATION: 95 % | RESPIRATION RATE: 13 BRPM

## 2021-07-26 DIAGNOSIS — Z86.79 HISTORY OF HYPERTENSION: ICD-10-CM

## 2021-07-26 DIAGNOSIS — R07.9 CHEST PAIN, UNSPECIFIED TYPE: ICD-10-CM

## 2021-07-26 DIAGNOSIS — R73.9 HYPERGLYCEMIA: ICD-10-CM

## 2021-07-26 LAB
ALBUMIN SERPL-MCNC: 3.2 G/DL (ref 3.4–5)
ALP SERPL-CCNC: 119 U/L (ref 40–150)
ALT SERPL W P-5'-P-CCNC: 40 U/L (ref 0–70)
ANION GAP SERPL CALCULATED.3IONS-SCNC: 2 MMOL/L (ref 3–14)
APTT PPP: 26 SECONDS (ref 22–38)
AST SERPL W P-5'-P-CCNC: 22 U/L (ref 0–45)
BASE EXCESS BLDV CALC-SCNC: 0.7 MMOL/L (ref -7.7–1.9)
BASOPHILS # BLD AUTO: 0.1 10E3/UL (ref 0–0.2)
BASOPHILS NFR BLD AUTO: 1 %
BILIRUB SERPL-MCNC: 0.4 MG/DL (ref 0.2–1.3)
BUN SERPL-MCNC: 13 MG/DL (ref 7–30)
CALCIUM SERPL-MCNC: 8.3 MG/DL (ref 8.5–10.1)
CHLORIDE BLD-SCNC: 114 MMOL/L (ref 94–109)
CO2 SERPL-SCNC: 25 MMOL/L (ref 20–32)
CREAT SERPL-MCNC: 0.84 MG/DL (ref 0.66–1.25)
EOSINOPHIL # BLD AUTO: 0.3 10E3/UL (ref 0–0.7)
EOSINOPHIL NFR BLD AUTO: 4 %
ERYTHROCYTE [DISTWIDTH] IN BLOOD BY AUTOMATED COUNT: 13.1 % (ref 10–15)
GFR SERPL CREATININE-BSD FRML MDRD: >90 ML/MIN/1.73M2
GLUCOSE BLD-MCNC: 224 MG/DL (ref 70–99)
GLUCOSE BLDC GLUCOMTR-MCNC: 231 MG/DL (ref 70–99)
HCO3 BLDV-SCNC: 26 MMOL/L (ref 21–28)
HCT VFR BLD AUTO: 44.6 % (ref 40–53)
HGB BLD-MCNC: 15.4 G/DL (ref 13.3–17.7)
HOLD SPECIMEN: NORMAL
IMM GRANULOCYTES # BLD: 0 10E3/UL
IMM GRANULOCYTES NFR BLD: 0 %
INR PPP: 0.89 (ref 0.85–1.15)
LACTATE SERPL-SCNC: 1.3 MMOL/L (ref 0.7–2)
LYMPHOCYTES # BLD AUTO: 2.3 10E3/UL (ref 0.8–5.3)
LYMPHOCYTES NFR BLD AUTO: 27 %
MCH RBC QN AUTO: 28.9 PG (ref 26.5–33)
MCHC RBC AUTO-ENTMCNC: 34.5 G/DL (ref 31.5–36.5)
MCV RBC AUTO: 84 FL (ref 78–100)
MONOCYTES # BLD AUTO: 0.4 10E3/UL (ref 0–1.3)
MONOCYTES NFR BLD AUTO: 5 %
NEUTROPHILS # BLD AUTO: 5.5 10E3/UL (ref 1.6–8.3)
NEUTROPHILS NFR BLD AUTO: 63 %
NRBC # BLD AUTO: 0 10E3/UL
NRBC BLD AUTO-RTO: 0 /100
O2/TOTAL GAS SETTING VFR VENT: 0 %
PCO2 BLDV: 43 MM HG (ref 40–50)
PH BLDV: 7.39 [PH] (ref 7.32–7.43)
PLATELET # BLD AUTO: 195 10E3/UL (ref 150–450)
PO2 BLDV: 55 MM HG (ref 25–47)
POTASSIUM BLD-SCNC: 3.9 MMOL/L (ref 3.4–5.3)
PROT SERPL-MCNC: 6.5 G/DL (ref 6.8–8.8)
RBC # BLD AUTO: 5.33 10E6/UL (ref 4.4–5.9)
SODIUM SERPL-SCNC: 141 MMOL/L (ref 133–144)
TROPONIN I SERPL-MCNC: <0.015 UG/L (ref 0–0.04)
TROPONIN I SERPL-MCNC: <0.015 UG/L (ref 0–0.04)
WBC # BLD AUTO: 8.7 10E3/UL (ref 4–11)

## 2021-07-26 PROCEDURE — 96374 THER/PROPH/DIAG INJ IV PUSH: CPT | Mod: 59 | Performed by: EMERGENCY MEDICINE

## 2021-07-26 PROCEDURE — 85610 PROTHROMBIN TIME: CPT | Performed by: EMERGENCY MEDICINE

## 2021-07-26 PROCEDURE — 250N000011 HC RX IP 250 OP 636: Performed by: EMERGENCY MEDICINE

## 2021-07-26 PROCEDURE — 85730 THROMBOPLASTIN TIME PARTIAL: CPT | Performed by: EMERGENCY MEDICINE

## 2021-07-26 PROCEDURE — 258N000003 HC RX IP 258 OP 636: Performed by: EMERGENCY MEDICINE

## 2021-07-26 PROCEDURE — 250N000009 HC RX 250: Performed by: EMERGENCY MEDICINE

## 2021-07-26 PROCEDURE — 82803 BLOOD GASES ANY COMBINATION: CPT | Performed by: EMERGENCY MEDICINE

## 2021-07-26 PROCEDURE — 93010 ELECTROCARDIOGRAM REPORT: CPT | Performed by: EMERGENCY MEDICINE

## 2021-07-26 PROCEDURE — 84484 ASSAY OF TROPONIN QUANT: CPT | Performed by: EMERGENCY MEDICINE

## 2021-07-26 PROCEDURE — 99285 EMERGENCY DEPT VISIT HI MDM: CPT | Mod: 25 | Performed by: EMERGENCY MEDICINE

## 2021-07-26 PROCEDURE — 250N000013 HC RX MED GY IP 250 OP 250 PS 637: Performed by: EMERGENCY MEDICINE

## 2021-07-26 PROCEDURE — 36415 COLL VENOUS BLD VENIPUNCTURE: CPT | Performed by: EMERGENCY MEDICINE

## 2021-07-26 PROCEDURE — 93005 ELECTROCARDIOGRAM TRACING: CPT | Performed by: EMERGENCY MEDICINE

## 2021-07-26 PROCEDURE — 99291 CRITICAL CARE FIRST HOUR: CPT | Mod: 25 | Performed by: EMERGENCY MEDICINE

## 2021-07-26 PROCEDURE — 70450 CT HEAD/BRAIN W/O DYE: CPT

## 2021-07-26 PROCEDURE — 85025 COMPLETE CBC W/AUTO DIFF WBC: CPT | Performed by: EMERGENCY MEDICINE

## 2021-07-26 PROCEDURE — 96361 HYDRATE IV INFUSION ADD-ON: CPT | Performed by: EMERGENCY MEDICINE

## 2021-07-26 PROCEDURE — 83605 ASSAY OF LACTIC ACID: CPT | Performed by: EMERGENCY MEDICINE

## 2021-07-26 PROCEDURE — 84484 ASSAY OF TROPONIN QUANT: CPT | Mod: 91 | Performed by: EMERGENCY MEDICINE

## 2021-07-26 PROCEDURE — 82040 ASSAY OF SERUM ALBUMIN: CPT | Performed by: EMERGENCY MEDICINE

## 2021-07-26 PROCEDURE — 70496 CT ANGIOGRAPHY HEAD: CPT

## 2021-07-26 RX ORDER — IOPAMIDOL 755 MG/ML
70 INJECTION, SOLUTION INTRAVASCULAR ONCE
Status: COMPLETED | OUTPATIENT
Start: 2021-07-26 | End: 2021-07-26

## 2021-07-26 RX ORDER — ONDANSETRON 2 MG/ML
4 INJECTION INTRAMUSCULAR; INTRAVENOUS ONCE
Status: COMPLETED | OUTPATIENT
Start: 2021-07-26 | End: 2021-07-26

## 2021-07-26 RX ORDER — ASPIRIN 81 MG/1
324 TABLET, CHEWABLE ORAL ONCE
Status: COMPLETED | OUTPATIENT
Start: 2021-07-26 | End: 2021-07-26

## 2021-07-26 RX ORDER — SODIUM CHLORIDE 9 MG/ML
1000 INJECTION, SOLUTION INTRAVENOUS CONTINUOUS
Status: DISCONTINUED | OUTPATIENT
Start: 2021-07-26 | End: 2021-07-26 | Stop reason: HOSPADM

## 2021-07-26 RX ADMIN — IOPAMIDOL 70 ML: 755 INJECTION, SOLUTION INTRAVENOUS at 09:48

## 2021-07-26 RX ADMIN — ASPIRIN 81 MG CHEWABLE TABLET 324 MG: 81 TABLET CHEWABLE at 09:37

## 2021-07-26 RX ADMIN — SODIUM CHLORIDE 100 ML: 9 INJECTION, SOLUTION INTRAVENOUS at 09:49

## 2021-07-26 RX ADMIN — SODIUM CHLORIDE 1000 ML: 9 INJECTION, SOLUTION INTRAVENOUS at 08:54

## 2021-07-26 RX ADMIN — ONDANSETRON 4 MG: 2 INJECTION INTRAMUSCULAR; INTRAVENOUS at 08:54

## 2021-07-26 ASSESSMENT — MIFFLIN-ST. JEOR: SCORE: 2520.59

## 2021-07-26 ASSESSMENT — ENCOUNTER SYMPTOMS
PSYCHIATRIC NEGATIVE: 1
MUSCULOSKELETAL NEGATIVE: 1
EYES NEGATIVE: 1
ALLERGIC/IMMUNOLOGIC NEGATIVE: 1
ENDOCRINE NEGATIVE: 1
CONSTITUTIONAL NEGATIVE: 1
CHEST TIGHTNESS: 1
HEADACHES: 1
HEMATOLOGIC/LYMPHATIC NEGATIVE: 1
NUMBNESS: 1

## 2021-07-26 NOTE — ED NOTES
This am overslept alarm, girl friend woke him , he had gibberish speech, lethargic. Blood sugar elevated, she gave insulin. Gradually mentation improved. On arrival alert oriented x4. PIHLLIPS no side weakness.    Intermittent chest pain for past 3 weeks. Pain left chest with radiation to left shoulder. Works out side, states

## 2021-07-26 NOTE — LETTER
July 26, 2021      To Whom It May Concern:      Ghanshyam Suggs was seen in our Emergency Department today, 07/26/21.  Please excuse him from missing work due to his evaluation in the department today.  Follow-up testing and reevaluation is recommended for follow-up care.  This work note is valid until July 28, 2021.  If symptoms worsen or recur he will need to return to the department to be reevaluated.    Sincerely,        Mian Chapman MD

## 2021-07-26 NOTE — DISCHARGE INSTRUCTIONS
1) Your evaluation today did not suggest an emergency diagnosis.  We discussed the possibility of a stroke given your report of sleeping past your typical alarm with garbled speech and high blood glucose at home.  During your visit you have had an unrevealing work-up including reassuring blood work, and head imaging (CT and CTA) showing no acute process.  We discussed the limitations of CT and CT imaging to exclude strokes in the posterior circulation.  We discussed the role of MRI imaging and agree that there is no indication for MRI imaging at this time however if your symptoms recur or new concerns you should return to the department to be reevaluated.    2) Be sure to call the clinic to schedule follow-up visit in the next 3 to 5 days and to reestablish care now that Dr Carbajal as retired.     3) We discussed concern about sleep apnea with excessive snoring reported by Pearl (your bed partner) and excessive daytime fatigue has a sleep study will be beneficial.  A referral will need to be placed by your clinic provider to help with follow-up testing as recommended.    4) although you appear stable for discharge to home at this time without recurrence of symptoms with normal strength and gait during her ED visit without speech difficulty or extremity weakness or numbness.  As reviewed if you develop new symptoms or concerns return to be reevaluated.    5) Be sure to begin taking blood pressure medication prescribed after your recent visit.

## 2021-07-26 NOTE — TELEPHONE ENCOUNTER
Pearl is calling and states that Ghanshyam is present and is  acting lethargic this morning and checked his blood was 293 and gave insulin and went down to 244.  Ghanshyam took an energy drink and now is back up to 255.  Patient is acting lethargic again.  Pearl states that he is acting confused.  FNA advised to phone 911 and Pearl agreed.  Pearl states that she does not know how much insulin she gave Ghanshyam.     COVID 19 Nurse Triage Plan/Patient Instructions    Please be aware that novel coronavirus (COVID-19) may be circulating in the community. If you develop symptoms such as fever, cough, or SOB or if you have concerns about the presence of another infection including coronavirus (COVID-19), please contact your health care provider or visit [email or web address].     Disposition/Instructions    Call to EMS/911 recommended. Follow protocol based instructions.     Bring Your Own Device:  Please also bring your smart device(s) (smart phones, tablets, laptops) and their charging cables for your personal use and to communicate with your care team during your visit.    Thank you for taking steps to prevent the spread of this virus.  o Limit your contact with others.  o Wear a simple mask to cover your cough.  o Wash your hands well and often.    Resources    [facility]: About COVID-19: [email or web address]    CDC: What to Do If You're Sick: www.cdc.gov/coronavirus/2019-ncov/about/steps-when-sick.html    CDC: Ending Home Isolation: www.cdc.gov/coronavirus/2019-ncov/hcp/disposition-in-home-patients.html     CDC: Caring for Someone: www.cdc.gov/coronavirus/2019-ncov/if-you-are-sick/care-for-someone.html     [facility]: Interim Guidance for Hospital Discharge to Home: www.health.[facility].mn.us/diseases/coronavirus/hcp/hospdischarge.pdf    [facility] clinical trials (COVID-19 research studies): [email or web address]     Below are the COVID-19 hotlines at the [facility]  [facility] ([facility]). Interpreters are available.   o For health questions: Call 879-100-7513 or 1-215.240.8772 (7 a.m. to 7 p.m.)  o For questions about schools and childcare: Call 882-415-1619 or 1-118.187.7656 (7 a.m. to 7 p.m.)                        COVID 19 Nurse Triage Plan/Patient Instructions    Please be aware that novel coronavirus (COVID-19) may be circulating in the community. If you develop symptoms such as fever, cough, or SOB or if you have concerns about the presence of another infection including coronavirus (COVID-19), please contact your health care provider or visit [email or web address].     Disposition/Instructions    Call to EMS/911 recommended. Follow protocol based instructions.     Bring Your Own Device:  Please also bring your smart device(s) (smart phones, tablets, laptops) and their charging cables for your personal use and to communicate with your care team during your visit.    Thank you for taking steps to prevent the spread of this virus.  o Limit your contact with others.  o Wear a simple mask to cover your cough.  o Wash your hands well and often.    Resources    [facility]: About COVID-19: [email or web address]    CDC: What to Do If You're Sick: www.cdc.gov/coronavirus/2019-ncov/about/steps-when-sick.html    CDC: Ending Home Isolation: www.cdc.gov/coronavirus/2019-ncov/hcp/disposition-in-home-patients.html     CDC: Caring for Someone: www.cdc.gov/coronavirus/2019-ncov/if-you-are-sick/care-for-someone.html     [facility]: Interim Guidance for Hospital Discharge to Home: www.health.[facility].mn.us/diseases/coronavirus/hcp/hospdischarge.pdf    [facility] clinical trials (COVID-19 research studies): [email or web address]/umn-[email or web address]     Below are the COVID-19 hotlines at the [place] [facility] ([facility]). Interpreters are available.   o For health questions: Call 313-621-6586 or 1-603.733.4949 (7 a.m. to 7 p.m.)  o For questions about schools  and childcare: Call 535-402-4698 or 1-163.597.1686 (7 a.m. to 7 p.m.)                       Reason for Disposition    Acting confused (e.g., disoriented, slurred speech)    Additional Information    Negative: Unconscious or difficult to awaken    Protocols used: DIABETES - HIGH BLOOD SUGAR-A-OH
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
## 2021-07-27 ENCOUNTER — NURSE TRIAGE (OUTPATIENT)
Dept: NURSING | Facility: CLINIC | Age: 41
End: 2021-07-27

## 2021-07-27 DIAGNOSIS — E11.65 TYPE 2 DIABETES MELLITUS WITH HYPERGLYCEMIA, WITHOUT LONG-TERM CURRENT USE OF INSULIN (H): ICD-10-CM

## 2021-07-27 RX ORDER — BLOOD-GLUCOSE METER
EACH MISCELLANEOUS
Qty: 1 KIT | Refills: 0 | Status: SHIPPED | OUTPATIENT
Start: 2021-07-27

## 2021-07-27 NOTE — TELEPHONE ENCOUNTER
Routing refill request to provider for review/approval because: Last ordered by Dr. Carbajal    Next 5 appointments (look out 90 days)    Jul 30, 2021  1:40 PM  Office Visit with ABDIFATAH Marie CNP [facility] [id number] [facility] [id number]  [phone]

## 2021-07-28 ENCOUNTER — E-CONSULT (OUTPATIENT)
Dept: SLEEP MEDICINE | Facility: CLINIC | Age: 41
End: 2021-07-28

## 2021-07-28 ENCOUNTER — VIRTUAL VISIT (OUTPATIENT)
Dept: INTERNAL MEDICINE | Facility: CLINIC | Age: 41
End: 2021-07-28
Payer: COMMERCIAL

## 2021-07-28 DIAGNOSIS — G47.33 OSA (OBSTRUCTIVE SLEEP APNEA): ICD-10-CM

## 2021-07-28 DIAGNOSIS — R40.0 DAYTIME SLEEPINESS: ICD-10-CM

## 2021-07-28 DIAGNOSIS — R06.83 SNORING: Primary | ICD-10-CM

## 2021-07-28 DIAGNOSIS — E11.65 TYPE 2 DIABETES MELLITUS WITH HYPERGLYCEMIA, WITH LONG-TERM CURRENT USE OF INSULIN (H): ICD-10-CM

## 2021-07-28 DIAGNOSIS — R40.4 EPISODE OF ALTERED CONSCIOUSNESS: ICD-10-CM

## 2021-07-28 DIAGNOSIS — Z79.4 TYPE 2 DIABETES MELLITUS WITH HYPERGLYCEMIA, WITH LONG-TERM CURRENT USE OF INSULIN (H): ICD-10-CM

## 2021-07-28 PROCEDURE — 99207 PR NO BILLABLE SERVICE THIS VISIT: CPT | Performed by: FAMILY MEDICINE

## 2021-07-28 PROCEDURE — 99215 OFFICE O/P EST HI 40 MIN: CPT | Performed by: NURSE PRACTITIONER

## 2021-07-28 RX ORDER — FLASH GLUCOSE SCANNING READER
1 EACH MISCELLANEOUS ONCE
Qty: 1 EACH | Refills: 0 | Status: SHIPPED | OUTPATIENT
Start: 2021-07-28 | End: 2021-07-28

## 2021-07-28 RX ORDER — NALOXONE HYDROCHLORIDE 4 MG/.1ML
SPRAY NASAL
COMMUNITY
Start: 2021-02-26

## 2021-07-28 RX ORDER — FLASH GLUCOSE SENSOR
1 KIT MISCELLANEOUS
Qty: 2 EACH | Refills: 5 | Status: SHIPPED | OUTPATIENT
Start: 2021-07-28 | End: 2021-12-28

## 2021-07-28 ASSESSMENT — ASTHMA QUESTIONNAIRES
QUESTION_2 LAST FOUR WEEKS HOW OFTEN HAVE YOU HAD SHORTNESS OF BREATH: MORE THAN ONCE A DAY
QUESTION_3 LAST FOUR WEEKS HOW OFTEN DID YOUR ASTHMA SYMPTOMS (WHEEZING, COUGHING, SHORTNESS OF BREATH, CHEST TIGHTNESS OR PAIN) WAKE YOU UP AT NIGHT OR EARLIER THAN USUAL IN THE MORNING: ONCE OR TWICE
QUESTION_4 LAST FOUR WEEKS HOW OFTEN HAVE YOU USED YOUR RESCUE INHALER OR NEBULIZER MEDICATION (SUCH AS ALBUTEROL): NOT AT ALL
QUESTION_5 LAST FOUR WEEKS HOW WOULD YOU RATE YOUR ASTHMA CONTROL: SOMEWHAT CONTROLLED
QUESTION_1 LAST FOUR WEEKS HOW MUCH OF THE TIME DID YOUR ASTHMA KEEP YOU FROM GETTING AS MUCH DONE AT WORK, SCHOOL OR AT HOME: NONE OF THE TIME
ACT_TOTALSCORE: 18

## 2021-07-28 ASSESSMENT — PATIENT HEALTH QUESTIONNAIRE - PHQ9: SUM OF ALL RESPONSES TO PHQ QUESTIONS 1-9: 4

## 2021-07-28 NOTE — PROGRESS NOTES
How would you like to obtain your AVS? Mychart  If dropped from the video visit, the video invitation should be resent by: [phone]  Will anyone else be joining your video visit? No          Video-Visit Details    Type of service:  Video Visit  Video Start Time: 10:44am   Video End Time (time video stopped): 11:05am   Originating Location (pt. Location): Home    Distant Location (provider location):  [facility]     Platform used for Video Visit: [employer]     Subjective: Ghanshyam is being seen today via video visit.        [facility]  Patient Name: Ghanshyam Suggs  Patient Age: 41 year old  YOB: 1980  MRN: [id number]    Date of Visit: 2021  Reason for Video Visit: chief complaint        Assessment / Plan / Medical Decision Makin. Snoring  2. Daytime sleepiness  3. WILLIAN (obstructive sleep apnea)  Patient has a history of sleep apnea and reports daytime somnolence and snoring.  He reports that previously he was unable to tolerate the CPAP he is open to having further discussions about other options.  - E-CONSULT TO SLEEP MEDICINE (ADULT OUTPT PROVIDER TO SPECIALIST WRITTEN QUESTION & RESPONSE)    4. Episode of altered consciousness  Patient was seen in the emergency department due to an episode of altered consciousness CT completed which was negative with recommendation to undergo MRA patient declined at that time but does like to proceed with this imaging and an order has been placed  - MRA Brain ([place]) wo & w Contrast; Future    5. Type 2 diabetes mellitus with hyperglycemia, with long-term current use of insulin (H)  Patient has a diagnosis of type 2 diabetes he had not previously been checking his blood sugars he has been taking insulin 80 mg once daily  and periodic Metformin.  Patient reports he has not been checking his blood sugars and taking his insulin dose unsure as to how much insulin he should be taking.  Did consult with the  diabetic educator with recommendation for patient to take 40 mg twice daily continue Metformin 500 twice daily check blood sugars twice daily once fasting 1 times a 2 hours post largest meal patient also started utilizing the nelly continuous glucose monitor and referrals were made to the diabetic educator.  Recommend follow-up with patient's primary in 2 weeks, sooner if needed.  Did advise patient to not be taking his Lantus on a as needed basis rather than 40 units scheduled twice daily.  - AMB Adult Diabetes Educator Referral; Future  - Continuous Blood Gluc  (FREESTYLE NELLY 14 DAY READER) BEULAH; 1 each once for 1 dose Use to read blood sugars as per 's instructions.  Dispense: 1 each; Refill: 0  - Continuous Blood Gluc Sensor (FREESTYLE NELLY 14 DAY SENSOR) MISC; 1 each every 14 days Change every 14 days.  Dispense: 2 each; Refill: 5              Orders Placed This Encounter   Procedures     REVIEW OF HEALTH MAINTENANCE PROTOCOL ORDERS     MRA Brain ([place]) wo & w Contrast     AMB Adult Diabetes Educator Referral     E-CONSULT TO SLEEP MEDICINE (ADULT OUTPT PROVIDER TO SPECIALIST WRITTEN QUESTION & RESPONSE)   Followup: No follow-ups on file. earlier if needed.  I spent a total of 46 minutes on the day of the visit.   Time spent doing chart review, history and exam, documentation and further activities per the note      Health Maintenance Review  Health Maintenance   Topic Date Due     PREVENTIVE CARE VISIT  Never done     ADVANCE CARE PLANNING  Never done     Pneumococcal Vaccine: Pediatrics (0 to 5 Years) and At-Risk Patients (6 to 64 Years) (1 of 2 - PPSV23) Never done     COVID-19 Vaccine (1) Never done     HIV SCREENING  Never done     HEPATITIS C SCREENING  Never done     HEPATITIS B IMMUNIZATION (1 of 3 - Risk 3-dose series) Never done     ASTHMA ACTION PLAN  12/21/2008     EYE EXAM  04/14/2016     ASTHMA CONTROL TEST  10/29/2019     INFLUENZA VACCINE (1) 09/01/2021     A1C  " 01/09/2022     PHQ-9  01/28/2022     LIPID  07/09/2022     MICROALBUMIN  07/09/2022     DIABETIC FOOT EXAM  07/09/2022     BMP  07/26/2022     ANNUAL REVIEW OF HM ORDERS  07/28/2022     DTAP/TDAP/TD IMMUNIZATION (2 - Td or Tdap) 11/25/2025     DEPRESSION ACTION PLAN  Completed     IPV IMMUNIZATION  Aged Out     MENINGITIS IMMUNIZATION  Aged Out         I am having Ghanshyam Suggs start on FreeStyle Marcelo 14 Day [facility] [place] and FreeStyle Marcelo 14 Day Sensor. I am also having him maintain his blood glucose monitoring, albuterol, ALPRAZolam, oxyCODONE IR, atorvastatin, Contour Next Test, ULTICARE SHORT, lisinopril, insulin glargine, metFORMIN, pantoprazole, Contour Next EZ, and Narcan.      HPI:  Ghanshyam Suggs is a 41 year old year old who presents to video visit today with chief concern about his diabetes and requesting a second opinion    He states he use to run \"high\" and was on insulin and metformin but never took metformin    The last couple of months he has been high stressed and drowsy.  Sunday morning patient reports blood sugar was 300 and patient states \"I was talking jiberish\". He was transferred via ambulance to the ER for treatment.    Patient's significant other is also present.     80 units without checking blood sugars previous to ER visit.    500mg twice daily metformin and is taking regularly  Previously. Was advised to take 40 units bid and took  units at bedtime.  He was periodically checking blood sugars.    2:55am 30 units of Lantus because blood sugar was 147.  BS was 100 this morning at 6am.  8:07am 114 and given glucose pill to raise blood sugar.       He states \"my left arm always goes numb\".      Oxycodone, tylenol, advil for back/neck pain from the pain clinic.  He has not had injection for over a year.      Patient is in a \"high stress situation\" reportedly with his kids.  He also has been working outside         The 10-year ASCVD risk score (Mitch EDMONDS Jr., et al., 2013) is: 6.9%    " Values used to calculate the score:      Age: 41 years      Sex: Male      Is Non- : No      Diabetic: Yes      Tobacco smoker: Yes      Systolic Blood Pressure: 123 mmHg      Is BP treated: Yes      HDL Cholesterol: 48 mg/dL      Total Cholesterol: 161 mg/dL    Review of Systems- unremarkable other than listed above and below       Current Scheduled Meds:  Outpatient Encounter Medications as of 7/28/2021   Medication Sig Dispense Refill     albuterol (PROAIR HFA/PROVENTIL HFA/VENTOLIN HFA) 108 (90 BASE) MCG/ACT Inhaler Inhale 2 puffs into the lungs 4 times daily 1 Inhaler 0     ALPRAZolam (XANAX) 0.5 MG tablet Take 0.5-1 mg by mouth daily as needed  0     atorvastatin (LIPITOR) 40 MG tablet Take 1 tablet (40 mg) by mouth daily [facility] DUE Feb 2020 90 tablet 0     blood glucose monitoring (ONE TOUCH DELICA) lancets Use to test blood sugars 4 times daily or as directed. 1 Box prn     Continuous Blood Gluc  (FREESTYLE NELLY 14 DAY READER) BEULAH 1 each once for 1 dose Use to read blood sugars as per 's instructions. 1 each 0     Continuous Blood Gluc Sensor (FREESTYLE NELLY 14 DAY SENSOR) MISC 1 each every 14 days Change every 14 days. 2 each 5     Contour Next EZ (CONTOUR NEXT EZ W/DEVICE KIT) w/Device KIT AS DIRECTED 1 kit 0     CONTOUR NEXT TEST test strip USE TO TEST BLOOD SUGAR 4 TIMES DAILY 100 strip 0     insulin glargine (LANTUS SOLOSTAR) 100 UNIT/ML pen Inject 65 Units Subcutaneous 2 times daily 45 mL 11     lisinopril (ZESTRIL) 20 MG tablet Take 1 tablet (20 mg) by mouth daily 30 tablet 3     metFORMIN (GLUCOPHAGE) 500 MG tablet Take 1 tablet (500 mg) by mouth 2 times daily (with meals) 180 tablet 4     naloxone (NARCAN) 4 MG/0.1ML nasal spray 1 spray (4 mg) intranasally into 1 nostril. Administer into alternating nostrils. May repeat every 2 to 3 minutes.       oxyCODONE IR (ROXICODONE) 30 MG tablet Take 30 mg by mouth every 4 hours as needed       pantoprazole  (PROTONIX) 40 MG EC tablet Take 2 tablets (80 mg) by mouth daily Take 30-60 minutes before a meal. 180 tablet 4     ULTICARE SHORT 31G X 8 MM insulin pen needle 1 device daily. 30 each 0     [DISCONTINUED] morphine (MS CONTIN) 30 MG CR tablet Take 1 tablet by mouth At Bedtime (Patient not taking: Reported on 2021)       No facility-administered encounter medications on file as of 2021.     No past medical history on file.  Past Surgical History:   Procedure Laterality Date     SURGICAL HISTORY OF -   Age 11    Slipped epiphysis,left hip repaired     SURGICAL HISTORY OF -       Left forearm fracture treated with closed reduction     Social History     Tobacco Use     Smoking status: Current Some Day Smoker     Packs/day: 0.50     Years: 10.00     Pack years: 5.00     Types: Cigarettes     Last attempt to quit: 10/7/2014     Years since quittin.8     Smokeless tobacco: Former User     Tobacco comment: e cigg occ   Substance Use Topics     Alcohol use: No     Alcohol/week: 0.0 standard drinks     Comment: rarely     Drug use: No     Family History   Problem Relation Age of Onset     Hypertension Mother      Heart Disease Mother      Depression Mother      Colon Polyps Mother      Coronary Artery Disease Mother      Hypertension Father      Heart Disease Father         stents in leg     C.A.D. Father      Coronary Artery Disease Father      Cerebrovascular Disease Maternal Grandfather      Alcohol/Drug Paternal Grandmother      Heart Disease Paternal Grandmother      Depression Paternal Grandmother      Alcohol/Drug Paternal Grandfather      Heart Disease Paternal Grandfather      Depression Paternal Grandfather      Social History     Social History Narrative     Not on file       Objective / Physical Examination:  There were no vitals filed for this visit.  Wt Readings from Last 3 Encounters:   21 (!) 158.8 kg (350 lb)   21 (!) 168.3 kg (371 lb)   19 (!) 169.4 kg (373 lb 6.4 oz)      There is no height or weight on file to calculate BMI.     GENERAL: Healthy, alert and no distress  EYES: Eyes grossly normal to inspection.  No discharge or erythema, or obvious scleral/conjunctival abnormalities.  RESP: No audible wheeze, cough, or visible cyanosis.  No visible retractions or increased work of breathing.    SKIN: Visible skin clear. No significant rash, abnormal pigmentation or lesions.  NEURO: Cranial nerves grossly intact.  Mentation and speech appropriate for age.  PSYCH: Mentation appears normal, affect normal/bright, judgement and insight intact, normal speech and appearance well-groomed.      Diagnostics:     No results found for any visits on 07/28/21.    Quality review:     [unfilled]    [unfilled]      Liliam Ho CNP  Internal Medicine  [address] [place] [id number]

## 2021-07-28 NOTE — TELEPHONE ENCOUNTER
Call received from girlfriend, Pearl  Verbal Consent received from pt to speak to Pearl Kang reports that Ghanshyam has been lethargic and sleeping all day.  She's been checking his blood glucose every 2 hours. The readings have ranged from 120-160    Ghanshyam was seen in the ER yesterday for Hyperglycemia and Chest pain.    Per ER note:  . . .  We discussed his excessive daytime fatigue and his loud snoring (partner reports she sleeps with ear plugs). I suggested patient would benefit from a sleep study and further evaluation by sleep medicine. . . .    She reports that his condition is similar to yesterday and no worse that when he was in the ER    Per AVS, advised:  Follow up with [facility]  Why: If symptoms worsen reoccur or new symptoms of concern as discussed    They were advised to follow up in 3-5 days.  He has an appointment in [place] on Fri.    She wants to find out about getting to a different clinic and about getting in sooner than Fri. She mentioned into getting into a clinic near [facility]    Transferred to scheduling    COVID 19 Nurse Triage Plan/Patient Instructions    Please be aware that novel coronavirus (COVID-19) may be circulating in the community. If you develop symptoms such as fever, cough, or SOB or if you have concerns about the presence of another infection including coronavirus (COVID-19), please contact your health care provider or visit [email or web address].     Disposition/Instructions    In-Person Visit with provider recommended. Reference Visit Selection Guide.    Thank you for taking steps to prevent the spread of this virus.  o Limit your contact with others.  o Wear a simple mask to cover your cough.  o Wash your hands well and often.    Resources    [facility]: About COVID-19: [email or web address]    CDC: What to Do If You're Sick: www.cdc.gov/coronavirus/2019-ncov/about/steps-when-sick.html    CDC: Ending Home Isolation:  www.cdc.gov/coronavirus/2019-ncov/hcp/disposition-in-home-patients.html     CDC: Caring for Someone: www.cdc.gov/coronavirus/2019-ncov/if-you-are-sick/care-for-someone.html     [facility]: Interim Guidance for Hospital Discharge to Home: www.health.[facility].mn.us/diseases/coronavirus/hcp/hospdischarge.pdf    [facility] clinical trials (COVID-19 research studies): clinicalaffairs.[facility]/n-clinical-trials     Below are the COVID-19 hotlines at the [place] Department of Health ([facility]). Interpreters are available.   o For health questions: Call 481-356-5006 or 1-356.633.2953 (7 a.m. to 7 p.m.)  o For questions about schools and childcare: Call 208-638-6591 or 1-642.986.6991 (7 a.m. to 7 p.m.)     Keisha Keller RN  [facility]              Reason for Disposition    [1] Recent medical visit within 24 hours AND [2] condition/symptoms SAME (unchanged) AND [3] caller has additional questions triager can answer    Protocols used: RECENT MEDICAL VISIT FOR ILLNESS FOLLOW-UP CALL-ESTEBAN

## 2021-07-28 NOTE — PROGRESS NOTES
ALL SMARTFIELDS MUST BE COMPLETED FOR PATIENT CARE AND BILLING    7/28/2021     E-Consult has been denied due to: Complexity of question, needs in-person referral.    Interprofessional consultation requested by:  Liliam Ho NP      Clinical Question/Purpose: MY CLINICAL QUESTION IS: sleep apnea     Patient assessment and information reviewed: Clinic visit dated 7/28/2021.    Recommendations:  Appears that clinical consult with sleep medicine to discuss treatment options was the intended goal.       The recommendations provided in this E-Consult are based on the clinical data available to me at this time, and are furnished without the benefit of a comprehensive in-person or virtual patient evaluation, Any new clinical issues or changes in patient status since the filing of this E-Consult will need to be taken into account when assessing these recommendations. Please contact me if you have further questions.    My total time spent reviewing clinical information and formulating assessment was 5 minutes.    Report sent automatically to requesting provider once signed.     Charge codes - [id number] (5+ minutes) or [id number] (No charge code)    Harvinder Gonzalez MD, MD

## 2021-07-29 ASSESSMENT — ASTHMA QUESTIONNAIRES: ACT_TOTALSCORE: 18

## 2021-09-18 ENCOUNTER — HEALTH MAINTENANCE LETTER (OUTPATIENT)
Age: 41
End: 2021-09-18

## 2021-12-02 DIAGNOSIS — I10 BENIGN ESSENTIAL HYPERTENSION: ICD-10-CM

## 2021-12-02 DIAGNOSIS — E11.65 TYPE 2 DIABETES MELLITUS WITH HYPERGLYCEMIA, UNSPECIFIED WHETHER LONG TERM INSULIN USE (H): ICD-10-CM

## 2021-12-06 RX ORDER — LISINOPRIL 20 MG/1
20 TABLET ORAL DAILY
Qty: 90 TABLET | Refills: 0 | Status: SHIPPED | OUTPATIENT
Start: 2021-12-06 | End: 2022-05-24

## 2021-12-28 DIAGNOSIS — Z79.4 TYPE 2 DIABETES MELLITUS WITH HYPERGLYCEMIA, WITH LONG-TERM CURRENT USE OF INSULIN (H): ICD-10-CM

## 2021-12-28 DIAGNOSIS — E11.65 TYPE 2 DIABETES MELLITUS WITH HYPERGLYCEMIA, WITH LONG-TERM CURRENT USE OF INSULIN (H): ICD-10-CM

## 2021-12-28 RX ORDER — FLASH GLUCOSE SENSOR
1 KIT MISCELLANEOUS
Qty: 2 EACH | Refills: 5 | Status: SHIPPED | OUTPATIENT
Start: 2021-12-28 | End: 2022-07-19

## 2022-02-27 ENCOUNTER — HEALTH MAINTENANCE LETTER (OUTPATIENT)
Age: 42
End: 2022-02-27

## 2022-05-23 DIAGNOSIS — I10 BENIGN ESSENTIAL HYPERTENSION: ICD-10-CM

## 2022-05-23 DIAGNOSIS — E11.65 TYPE 2 DIABETES MELLITUS WITH HYPERGLYCEMIA, UNSPECIFIED WHETHER LONG TERM INSULIN USE (H): ICD-10-CM

## 2022-05-24 RX ORDER — LISINOPRIL 20 MG/1
20 TABLET ORAL DAILY
Qty: 90 TABLET | Refills: 0 | Status: SHIPPED | OUTPATIENT
Start: 2022-05-24 | End: 2023-04-03

## 2022-07-14 DIAGNOSIS — E11.65 TYPE 2 DIABETES MELLITUS WITH HYPERGLYCEMIA, WITHOUT LONG-TERM CURRENT USE OF INSULIN (H): ICD-10-CM

## 2022-07-14 NOTE — TELEPHONE ENCOUNTER
"Requested Prescriptions   Pending Prescriptions Disp Refills    insulin glargine (LANTUS SOLOSTAR) 100 UNIT/ML pen 45 mL 11     Sig: Inject 65 Units Subcutaneous 2 times daily        Long Acting Insulin Protocol Failed - 7/14/2022  9:46 AM        Failed - HgbA1C in past 3 or 6 months     If HgbA1C is 8 or greater, it needs to be on file within the past 3 months.  If less than 8, must be on file within the past 6 months.     Recent Labs   Lab Test 07/09/21  0713   A1C 6.6*             Failed - Recent (6 mo) or future (30 days) visit within the authorizing provider's specialty     Patient had office visit in the last 6 months or has a visit in the next 30 days with authorizing provider or within the authorizing provider's specialty.  See \"Patient Info\" tab in inbasket, or \"Choose Columns\" in Meds & Orders section of the refill encounter.            Passed - Serum creatinine on file in past 12 months     Recent Labs   Lab Test 07/26/21  0851   CR 0.84       Ok to refill medication if creatinine is low          Passed - Medication is active on med list        Passed - Patient is age 18 or older              "

## 2022-07-15 ENCOUNTER — PATIENT OUTREACH (OUTPATIENT)
Dept: FAMILY MEDICINE | Facility: CLINIC | Age: 42
End: 2022-07-15

## 2022-07-15 RX ORDER — INSULIN GLARGINE 100 [IU]/ML
65 INJECTION, SOLUTION SUBCUTANEOUS 2 TIMES DAILY
Qty: 45 ML | Refills: 2 | Status: SHIPPED | OUTPATIENT
Start: 2022-07-15 | End: 2022-09-23

## 2022-07-15 NOTE — TELEPHONE ENCOUNTER
Patient Quality Outreach    Patient is due for the following:   Diabetes -  A1C, LDL (Fasting), Eye Exam, Microalbumin and Diabetic Follow-Up Visit  Physical  - Due after now    NEXT STEPS:   Schedule a yearly physical PLUS diabetes follow-up    Type of outreach:    Sent letter. and mychart message    Next Steps:  Reach out within 90 days via MyChart and Letter.    Max number of attempts reached: Yes. Will try again in 90 days if patient still on fail list.    Questions for provider review:    None     Radha Ross MD

## 2022-07-15 NOTE — LETTER
July 15, 2022      Ghanshyam Suggs  [address] [facility]   [place] [address]      Your healthcare team cares about your health. To provide you with the best care, we have reviewed your chart and based on our findings, we see that you are due to:     - DIABETES FOLLOW UP: Schedule a diabetic follow up appointment as a Preventative plus diabetes follow-up. Patients with diabetes should generally see their provider every 3-6 months.    - OTHER FOLLOW UP:  Annual preventive health visit including bloodwork    If you have already completed these items, please contact the clinic via phone or [employer] so your care team can review and update your records.  Thank you for choosing [facility] for your healthcare needs. For any questions, concerns, or to schedule an appointment please contact the clinic.       Healthy Regards,      Your [facility] Care Team

## 2022-07-19 DIAGNOSIS — Z79.4 TYPE 2 DIABETES MELLITUS WITH HYPERGLYCEMIA, WITH LONG-TERM CURRENT USE OF INSULIN (H): ICD-10-CM

## 2022-07-19 DIAGNOSIS — E11.65 TYPE 2 DIABETES MELLITUS WITH HYPERGLYCEMIA, WITH LONG-TERM CURRENT USE OF INSULIN (H): ICD-10-CM

## 2022-07-19 DIAGNOSIS — E11.65 TYPE 2 DIABETES MELLITUS WITH HYPERGLYCEMIA, UNSPECIFIED WHETHER LONG TERM INSULIN USE (H): ICD-10-CM

## 2022-07-19 RX ORDER — FLASH GLUCOSE SENSOR
1 KIT MISCELLANEOUS
Qty: 2 EACH | Refills: 11 | Status: SHIPPED | OUTPATIENT
Start: 2022-07-19 | End: 2023-06-28

## 2022-07-19 NOTE — TELEPHONE ENCOUNTER
"Requested Prescriptions   Pending Prescriptions Disp Refills    metFORMIN (GLUCOPHAGE) 500 MG tablet 180 tablet 4     Sig: Take 1 tablet (500 mg) by mouth 2 times daily (with meals)        Biguanide Agents Failed - 2022  9:35 AM        Failed - Patient has documented A1c within the specified period of time.     If HgbA1C is 8 or greater, it needs to be on file within the past 3 months.  If less than 8, must be on file within the past 6 months.     Recent Labs   Lab Test 21  0713   A1C 6.6*             Failed - Recent (6 mo) or future (30 days) visit within the authorizing provider's specialty     Patient had office visit in the last 6 months or has a visit in the next 30 days with authorizing provider or within the authorizing provider's specialty.  See \"Patient Info\" tab in inbasket, or \"Choose Columns\" in Meds & Orders section of the refill encounter.            Passed - Patient is age 10 or older        Passed - Patient's CR is NOT>1.4 OR Patient's EGFR is NOT<45 within past 12 mos.       Recent Labs   Lab Test 21  0851 21  0713   GFRESTIMATED >90 >90   GFRESTBLACK  --  >90       Recent Labs   Lab Test 21  0851   CR 0.84             Passed - Patient does NOT have a diagnosis of CHF.        Passed - Medication is active on med list           Continuous Blood Gluc Sensor (FREESTYLE NELLY 14 DAY SENSOR) MISC 2 each 5     Si each every 14 days Change every 14 days.        There is no refill protocol information for this order           "

## 2022-07-19 NOTE — TELEPHONE ENCOUNTER
Chart reviewed  Overdue for appointment, last seen > 1 year ago  Refilled metformin for 3 months and refills all of his glucose sensor    aRdha Ross MD

## 2022-08-24 DIAGNOSIS — E11.9 TYPE 2 DIABETES MELLITUS WITH TARGET HEMOGLOBIN A1C OF LESS THAN 8.0 PERCENT (H): ICD-10-CM

## 2022-08-24 RX ORDER — METHYLPREDNISOLONE SODIUM SUCCINATE 125 MG/2ML
INJECTION, POWDER, FOR SOLUTION INTRAMUSCULAR; INTRAVENOUS
Qty: 100 EACH | Refills: 0 | Status: SHIPPED | OUTPATIENT
Start: 2022-08-24

## 2022-08-24 NOTE — TELEPHONE ENCOUNTER
Called Pt and let him know that he is due for a DM follow up. Pt scheduled with a [facility] provider on 9/12/22. Sending alonso refill to get him to that apt.     Sherrill Jovel RN

## 2022-09-23 DIAGNOSIS — E11.65 TYPE 2 DIABETES MELLITUS WITH HYPERGLYCEMIA, WITHOUT LONG-TERM CURRENT USE OF INSULIN (H): ICD-10-CM

## 2022-09-23 RX ORDER — INSULIN GLARGINE 100 [IU]/ML
65 INJECTION, SOLUTION SUBCUTANEOUS 2 TIMES DAILY
Qty: 15 ML | Refills: 0 | Status: SHIPPED | OUTPATIENT
Start: 2022-09-23

## 2022-09-23 NOTE — TELEPHONE ENCOUNTER
"Requested Prescriptions   Pending Prescriptions Disp Refills    insulin glargine (LANTUS SOLOSTAR) 100 UNIT/ML pen 45 mL 2     Sig: Inject 65 Units Subcutaneous 2 times daily        Long Acting Insulin Protocol Failed - 9/23/2022  1:31 PM        Failed - Serum creatinine on file in past 12 months     Recent Labs   Lab Test 07/26/21  0851   CR 0.84       Ok to refill medication if creatinine is low          Failed - HgbA1C in past 3 or 6 months     If HgbA1C is 8 or greater, it needs to be on file within the past 3 months.  If less than 8, must be on file within the past 6 months.     Recent Labs   Lab Test 07/09/21  0713   A1C 6.6*             Failed - Recent (6 mo) or future (30 days) visit within the authorizing provider's specialty     Patient had office visit in the last 6 months or has a visit in the next 30 days with authorizing provider or within the authorizing provider's specialty.  See \"Patient Info\" tab in inbasket, or \"Choose Columns\" in Meds & Orders section of the refill encounter.            Passed - Medication is active on med list        Passed - Patient is age 18 or older              "

## 2022-09-23 NOTE — TELEPHONE ENCOUNTER
He was given alonso on refill back in August to get him through to a 9/12/2022 appointment.    It looks like he no showed to that appointment.  He does not have any upcoming appointments scheduled.  The last time he was seen for his diabetes was over a year ago.  It is dangerous at this point to be managing insulin without any follow-up.  Will refill 1 vial at this time.  No further refills without an office visit.

## 2022-09-26 NOTE — TELEPHONE ENCOUNTER
Writer called patient and left him a message informing him of the refill and  that he needs an appointment for further refills.    Onel MEHTA [facility]

## 2022-11-02 DIAGNOSIS — E11.65 TYPE 2 DIABETES MELLITUS WITH HYPERGLYCEMIA, WITHOUT LONG-TERM CURRENT USE OF INSULIN (H): ICD-10-CM

## 2022-11-02 RX ORDER — INSULIN GLARGINE 100 [IU]/ML
65 INJECTION, SOLUTION SUBCUTANEOUS 2 TIMES DAILY
Qty: 15 ML | Refills: 0 | OUTPATIENT
Start: 2022-11-02

## 2022-11-02 NOTE — TELEPHONE ENCOUNTER
Refill request declined due to not been seen in >16 months. Unsafe to continue prescribing insulin without monitoring, and he was already given alonso refills at least twice now.    Please call and help him make appointment (LVM if needed). I approve use of saved same-day slots for him if this helps him get seen sooner.      Radha Ross MD

## 2022-11-02 NOTE — LETTER
[facility]  [address]  [place] [facility] [address]  [phone]  November 3, 2022    Ghanshyam Suggs  [address] [address] [place] [id number]    Dear Ghanshyam,    We care about your health and have reviewed your health plan including your medical conditions, medication list, and lab results.  Based on this review, it is recommended that you follow up regarding the following health topic(s):     -Wellness (Physical) Visit - NO medication refills until seen for appt in clinic    Please call us at [phone] (or use Ed[employer]ame) to address the above recommendations.     Thank you for trusting [facility] and we appreciate the opportunity to serve you.  We look forward to supporting your healthcare needs in the future.    Healthy Regards,      Your Health Care Team  [facility]

## 2022-11-02 NOTE — TELEPHONE ENCOUNTER
"Requested Prescriptions   Pending Prescriptions Disp Refills    insulin glargine (LANTUS SOLOSTAR) 100 UNIT/ML pen 15 mL 0     Sig: Inject 65 Units Subcutaneous 2 times daily       Long Acting Insulin Protocol Failed - 11/2/2022  8:12 AM        Failed - Serum creatinine on file in past 12 months     Recent Labs   Lab Test 07/26/21  0851   CR 0.84       Ok to refill medication if creatinine is low          Failed - HgbA1C in past 3 or 6 months     If HgbA1C is 8 or greater, it needs to be on file within the past 3 months.  If less than 8, must be on file within the past 6 months.     Recent Labs   Lab Test 07/09/21  0713   A1C 6.6*             Failed - Recent (6 mo) or future (30 days) visit within the authorizing provider's specialty     Patient had office visit in the last 6 months or has a visit in the next 30 days with authorizing provider or within the authorizing provider's specialty.  See \"Patient Info\" tab in inbasket, or \"Choose Columns\" in Meds & Orders section of the refill encounter.            Passed - Medication is active on med list        Passed - Patient is age 18 or older           Patient was notified in 9/23/22 Lantus refill request that he needed an appointment for further refills.  "

## 2022-11-19 ENCOUNTER — HEALTH MAINTENANCE LETTER (OUTPATIENT)
Age: 42
End: 2022-11-19

## 2023-01-26 DIAGNOSIS — E11.65 TYPE 2 DIABETES MELLITUS WITH HYPERGLYCEMIA, UNSPECIFIED WHETHER LONG TERM INSULIN USE (H): ICD-10-CM

## 2023-01-26 DIAGNOSIS — K21.9 GASTROESOPHAGEAL REFLUX DISEASE WITHOUT ESOPHAGITIS: ICD-10-CM

## 2023-01-27 RX ORDER — PANTOPRAZOLE SODIUM 40 MG/1
80 TABLET, DELAYED RELEASE ORAL DAILY
Qty: 180 TABLET | Refills: 4 | OUTPATIENT
Start: 2023-01-27

## 2023-01-27 NOTE — TELEPHONE ENCOUNTER
"Ghanshyam needs a diabetic follow-up appointment. He no showed the past two appointments. Thank you!    Eduarda Barnes, RN      Requested Prescriptions   Pending Prescriptions Disp Refills     metFORMIN (GLUCOPHAGE) 500 MG tablet 180 tablet 0     Sig: Take 1 tablet (500 mg) by mouth 2 times daily (with meals) Please make an appointment for further refills       Biguanide Agents Failed - 1/26/2023  1:51 PM        Failed - Patient has documented A1c within the specified period of time.     If HgbA1C is 8 or greater, it needs to be on file within the past 3 months.  If less than 8, must be on file within the past 6 months.     Recent Labs   Lab Test 07/09/21 0713   A1C 6.6*             Failed - Patient's CR is NOT>1.4 OR Patient's EGFR is NOT<45 within past 12 mos.     Recent Labs   Lab Test 07/26/21  0851 07/09/21 0713   GFRESTIMATED >90 >90   GFRESTBLACK  --  >90       Recent Labs   Lab Test 07/26/21 0851   CR 0.84             Failed - Recent (6 mo) or future (30 days) visit within the authorizing provider's specialty     Patient had office visit in the last 6 months or has a visit in the next 30 days with authorizing provider or within the authorizing provider's specialty.  See \"Patient Info\" tab in inbasket, or \"Choose Columns\" in Meds & Orders section of the refill encounter.            Passed - Patient is age 10 or older        Passed - Patient does NOT have a diagnosis of CHF.        Passed - Medication is active on med list           pantoprazole (PROTONIX) 40 MG EC tablet 180 tablet 4     Sig: Take 2 tablets (80 mg) by mouth daily Take 30-60 minutes before a meal.       There is no refill protocol information for this order          "

## 2023-02-27 DIAGNOSIS — E11.65 TYPE 2 DIABETES MELLITUS WITH HYPERGLYCEMIA, WITHOUT LONG-TERM CURRENT USE OF INSULIN (H): ICD-10-CM

## 2023-02-27 DIAGNOSIS — E11.65 TYPE 2 DIABETES MELLITUS WITH HYPERGLYCEMIA, UNSPECIFIED WHETHER LONG TERM INSULIN USE (H): ICD-10-CM

## 2023-02-27 NOTE — TELEPHONE ENCOUNTER
"Ghanshyam has no showed past two appointments. Needs a diabetic follow-up with refills. Thank you!    Eduarda Barnes, RN      Requested Prescriptions   Pending Prescriptions Disp Refills     metFORMIN (GLUCOPHAGE) 500 MG tablet 180 tablet 0     Sig: Take 1 tablet (500 mg) by mouth 2 times daily (with meals) Please make an appointment for further refills       Biguanide Agents Failed - 2/27/2023 11:42 AM        Failed - Patient has documented A1c within the specified period of time.     If HgbA1C is 8 or greater, it needs to be on file within the past 3 months.  If less than 8, must be on file within the past 6 months.     Recent Labs   Lab Test 07/09/21 0713   A1C 6.6*             Failed - Patient's CR is NOT>1.4 OR Patient's EGFR is NOT<45 within past 12 mos.     Recent Labs   Lab Test 07/26/21  0851 07/09/21 0713   GFRESTIMATED >90 >90   GFRESTBLACK  --  >90       Recent Labs   Lab Test 07/26/21  0851   CR 0.84             Failed - Recent (6 mo) or future (30 days) visit within the authorizing provider's specialty     Patient had office visit in the last 6 months or has a visit in the next 30 days with authorizing provider or within the authorizing provider's specialty.  See \"Patient Info\" tab in inbasket, or \"Choose Columns\" in Meds & Orders section of the refill encounter.            Passed - Patient is age 10 or older        Passed - Patient does NOT have a diagnosis of CHF.        Passed - Medication is active on med list           insulin glargine (LANTUS SOLOSTAR) 100 UNIT/ML pen 15 mL 0     Sig: Inject 65 Units Subcutaneous 2 times daily       Long Acting Insulin Protocol Failed - 2/27/2023 11:42 AM        Failed - Serum creatinine on file in past 12 months     Recent Labs   Lab Test 07/26/21  0851   CR 0.84       Ok to refill medication if creatinine is low          Failed - HgbA1C in past 3 or 6 months     If HgbA1C is 8 or greater, it needs to be on file within the past 3 months.  If less than 8, must " "be on file within the past 6 months.     Recent Labs   Lab Test 07/09/21  0713   A1C 6.6*             Failed - Recent (6 mo) or future (30 days) visit within the authorizing provider's specialty     Patient had office visit in the last 6 months or has a visit in the next 30 days with authorizing provider or within the authorizing provider's specialty.  See \"Patient Info\" tab in inbasket, or \"Choose Columns\" in Meds & Orders section of the refill encounter.            Passed - Medication is active on med list        Passed - Patient is age 18 or older             "

## 2023-02-27 NOTE — LETTER
[facility]  [address]  [facility] [address]  [phone]  February 27, 2023    Ghanshyam Suggs  [address] [address] [place] [id number]    Dear Ghanshyam,    We care about your health and have reviewed your health plan including your medical conditions, medication list, and lab results.  Based on this review, it is recommended that you follow up regarding the following health topic(s):     -Diabetes - Appointment needed for medication refills     Please call us at [phone] (or use [employer]) to schedule appt to address the above recommendations.     Thank you for trusting [facility] and we appreciate the opportunity to serve you.  We look forward to supporting your healthcare needs in the future.    Healthy Regards,      Your Health Care Team  [facility]

## 2023-02-28 RX ORDER — INSULIN GLARGINE 100 [IU]/ML
65 INJECTION, SOLUTION SUBCUTANEOUS 2 TIMES DAILY
Qty: 15 ML | Refills: 0 | OUTPATIENT
Start: 2023-02-28

## 2023-02-28 NOTE — TELEPHONE ENCOUNTER
Medication refills have been denied, he hasn't been seen in our clinic nor our other primary care clinics in >1.5 years  Radha Ross MD

## 2023-03-31 DIAGNOSIS — I10 BENIGN ESSENTIAL HYPERTENSION: ICD-10-CM

## 2023-03-31 DIAGNOSIS — E11.65 TYPE 2 DIABETES MELLITUS WITH HYPERGLYCEMIA, UNSPECIFIED WHETHER LONG TERM INSULIN USE (H): ICD-10-CM

## 2023-03-31 NOTE — TELEPHONE ENCOUNTER
"Called and left a message to call the clinic to schedule an appointment due to last office visit 7/9/21. Patient needs appointment to establish care with refills.     Routing refill request to provider for review/approval because:  [place] given x1 and patient did not follow up, please advise  Patient needs to be seen because it has been more than 1 year since last office visit.  Needs to establish care with PCP.          Requested Prescriptions   Pending Prescriptions Disp Refills     metFORMIN (GLUCOPHAGE) 500 MG tablet 180 tablet 0     Sig: Take 1 tablet (500 mg) by mouth 2 times daily (with meals) Please make an appointment for further refills       Biguanide Agents Failed - 3/31/2023  2:59 PM        Failed - Patient has documented A1c within the specified period of time.     If HgbA1C is 8 or greater, it needs to be on file within the past 3 months.  If less than 8, must be on file within the past 6 months.     Recent Labs   Lab Test 07/09/21  0713   A1C 6.6*             Failed - Patient's CR is NOT>1.4 OR Patient's EGFR is NOT<45 within past 12 mos.     Recent Labs   Lab Test 07/26/21  0851 07/09/21  0713   GFRESTIMATED >90 >90   GFRESTBLACK  --  >90       Recent Labs   Lab Test 07/26/21  0851   CR 0.84             Failed - Recent (6 mo) or future (30 days) visit within the authorizing provider's specialty     Patient had office visit in the last 6 months or has a visit in the next 30 days with authorizing provider or within the authorizing provider's specialty.  See \"Patient Info\" tab in inbasket, or \"Choose Columns\" in Meds & Orders section of the refill encounter.            Passed - Patient is age 10 or older        Passed - Patient does NOT have a diagnosis of CHF.        Passed - Medication is active on med list           lisinopril (ZESTRIL) 20 MG tablet 90 tablet 0     Sig: Take 1 tablet (20 mg) by mouth daily       ACE Inhibitors (Including Combos) Protocol Failed - 3/31/2023  2:59 PM        Failed - " "Blood pressure under 140/90 in past 12 months     BP Readings from Last 3 Encounters:   07/26/21 123/81   07/09/21 (!) 126/100   07/30/19 112/70                 Failed - Recent (12 mo) or future (30 days) visit within the authorizing provider's specialty     Patient has had an office visit with the authorizing provider or a provider within the authorizing providers department within the previous 12 mos or has a future within next 30 days. See \"Patient Info\" tab in inbasket, or \"Choose Columns\" in Meds & Orders section of the refill encounter.              Failed - Normal serum creatinine on file in past 12 months     Recent Labs   Lab Test 07/26/21  0851   CR 0.84       Ok to refill medication if creatinine is low          Failed - Normal serum potassium on file in past 12 months     Recent Labs   Lab Test 07/26/21  0851   POTASSIUM 3.9             Passed - Medication is active on med list        Passed - Patient is age 18 or older           Elva Sellers RN on 3/31/2023 at 3:32 PM    "

## 2023-04-03 RX ORDER — LISINOPRIL 20 MG/1
20 TABLET ORAL DAILY
Qty: 30 TABLET | Refills: 0 | Status: SHIPPED | OUTPATIENT
Start: 2023-04-03

## 2023-04-28 DIAGNOSIS — K21.9 GASTROESOPHAGEAL REFLUX DISEASE WITHOUT ESOPHAGITIS: ICD-10-CM

## 2023-04-28 RX ORDER — PANTOPRAZOLE SODIUM 40 MG/1
80 TABLET, DELAYED RELEASE ORAL DAILY
Qty: 180 TABLET | Refills: 0 | Status: SHIPPED | OUTPATIENT
Start: 2023-04-28

## 2023-04-28 NOTE — TELEPHONE ENCOUNTER
Routing refill request to provider for review/approval because:  Patient needs to establish care with new provider as Dr. Ross is not a provider practicing at the [facility], routing to out of office provider pool.      Recent (12 mo) or future (30 days) visit within the authorizing provider's specialty      JANINE Pinzon

## 2023-04-28 NOTE — TELEPHONE ENCOUNTER
Covering provider. Signed order for pantoprazole, patient needs to follow up with PCP or new provider for additional refills.     Aliza Maki MD

## 2023-05-25 DIAGNOSIS — E11.65 TYPE 2 DIABETES MELLITUS WITH HYPERGLYCEMIA, UNSPECIFIED WHETHER LONG TERM INSULIN USE (H): ICD-10-CM

## 2023-05-25 DIAGNOSIS — I10 BENIGN ESSENTIAL HYPERTENSION: ICD-10-CM

## 2023-05-25 RX ORDER — LISINOPRIL 20 MG/1
20 TABLET ORAL DAILY
Qty: 30 TABLET | Refills: 0 | OUTPATIENT
Start: 2023-05-25

## 2023-05-25 NOTE — TELEPHONE ENCOUNTER
"Requested Prescriptions   Pending Prescriptions Disp Refills     lisinopril (ZESTRIL) 20 MG tablet [Pharmacy Med Name: lisinopril 20 mg tablet] 30 tablet 0     Sig: Take 1 tablet (20 mg) by mouth daily Filled Rx for 1 month. Patient is due for annual office visit, please notify. Thank you. ABDIFATAH Lara CNP       ACE Inhibitors (Including Combos) Protocol Failed - 5/25/2023  2:47 PM        Failed - Blood pressure under 140/90 in past 12 months     BP Readings from Last 3 Encounters:   07/26/21 123/81   07/09/21 (!) 126/100   07/30/19 112/70                 Failed - Recent (12 mo) or future (30 days) visit within the authorizing provider's specialty     Patient has had an office visit with the authorizing provider or a provider within the authorizing providers department within the previous 12 mos or has a future within next 30 days. See \"Patient Info\" tab in inbasket, or \"Choose Columns\" in Meds & Orders section of the refill encounter.              Failed - Normal serum creatinine on file in past 12 months     Recent Labs   Lab Test 07/26/21  0851   CR 0.84       Ok to refill medication if creatinine is low          Failed - Normal serum potassium on file in past 12 months     Recent Labs   Lab Test 07/26/21  0851   POTASSIUM 3.9             Passed - Medication is active on med list        Passed - Patient is age 18 or older           metFORMIN (GLUCOPHAGE) 500 MG tablet [Pharmacy Med Name: metformin 500 mg tablet] 60 tablet 0     Sig: Take 1 tablet (500 mg) by mouth 2 times daily (with meals) Filled Rx for 1 month. Patient is due for annual office visit, please notify. Thank you. ABDIFATAH Lara CNP       Biguanide Agents Failed - 5/25/2023  2:47 PM        Failed - Patient has documented A1c within the specified period of time.     If HgbA1C is 8 or greater, it needs to be on file within the past 3 months.  If less than 8, must be on file within the past 6 months.     Recent Labs   Lab Test " "07/09/21 0713   A1C 6.6*             Failed - Patient's CR is NOT>1.4 OR Patient's EGFR is NOT<45 within past 12 mos.     Recent Labs   Lab Test 07/26/21 0851 07/09/21 0713   GFRESTIMATED >90 >90   GFRESTBLACK  --  >90       Recent Labs   Lab Test 07/26/21 0851   CR 0.84             Failed - Recent (6 mo) or future (30 days) visit within the authorizing provider's specialty     Patient had office visit in the last 6 months or has a visit in the next 30 days with authorizing provider or within the authorizing provider's specialty.  See \"Patient Info\" tab in inbasket, or \"Choose Columns\" in Meds & Orders section of the refill encounter.            Passed - Patient is age 10 or older        Passed - Patient does NOT have a diagnosis of CHF.        Passed - Medication is active on med list             "

## 2023-06-01 ENCOUNTER — HEALTH MAINTENANCE LETTER (OUTPATIENT)
Age: 43
End: 2023-06-01

## 2023-06-27 DIAGNOSIS — E11.65 TYPE 2 DIABETES MELLITUS WITH HYPERGLYCEMIA, UNSPECIFIED WHETHER LONG TERM INSULIN USE (H): ICD-10-CM

## 2023-06-27 DIAGNOSIS — I10 BENIGN ESSENTIAL HYPERTENSION: ICD-10-CM

## 2023-06-27 DIAGNOSIS — E11.65 TYPE 2 DIABETES MELLITUS WITH HYPERGLYCEMIA, WITH LONG-TERM CURRENT USE OF INSULIN (H): ICD-10-CM

## 2023-06-27 DIAGNOSIS — Z79.4 TYPE 2 DIABETES MELLITUS WITH HYPERGLYCEMIA, WITH LONG-TERM CURRENT USE OF INSULIN (H): ICD-10-CM

## 2023-06-27 NOTE — TELEPHONE ENCOUNTER
"Ghanshyam is due for a diabetic follow-up. Has no showed past two appointments. Thank you!    Eduarda Barnes, RN      Requested Prescriptions   Pending Prescriptions Disp Refills     lisinopril (ZESTRIL) 20 MG tablet [Pharmacy Med Name: lisinopril 20 mg tablet] 30 tablet 0     Sig: Take 1 tablet (20 mg) by mouth daily Filled Rx for 1 month. Patient is due for annual office visit, please notify. Thank you. ABDIFATAH Lara CNP       ACE Inhibitors (Including Combos) Protocol Failed - 6/27/2023 11:43 AM        Failed - Blood pressure under 140/90 in past 12 months     BP Readings from Last 3 Encounters:   07/26/21 123/81   07/09/21 (!) 126/100   07/30/19 112/70                 Failed - Recent (12 mo) or future (30 days) visit within the authorizing provider's specialty     Patient has had an office visit with the authorizing provider or a provider within the authorizing providers department within the previous 12 mos or has a future within next 30 days. See \"Patient Info\" tab in inbasket, or \"Choose Columns\" in Meds & Orders section of the refill encounter.              Failed - Normal serum creatinine on file in past 12 months     Recent Labs   Lab Test 07/26/21  0851   CR 0.84       Ok to refill medication if creatinine is low          Failed - Normal serum potassium on file in past 12 months     Recent Labs   Lab Test 07/26/21  0851   POTASSIUM 3.9             Passed - Medication is active on med list        Passed - Patient is age 18 or older           metFORMIN (GLUCOPHAGE) 500 MG tablet [Pharmacy Med Name: metformin 500 mg tablet] 60 tablet 0     Sig: Take 1 tablet (500 mg) by mouth 2 times daily (with meals) Filled Rx for 1 month. Patient is due for annual office visit, please notify. Thank you. ABDIFATAH Lara CNP       Biguanide Agents Failed - 6/27/2023 11:43 AM        Failed - Patient has documented A1c within the specified period of time.     If HgbA1C is 8 or greater, it needs to be on file within " "the past 3 months.  If less than 8, must be on file within the past 6 months.     Recent Labs   Lab Test 07/09/21 0713   A1C 6.6*             Failed - Patient's CR is NOT>1.4 OR Patient's EGFR is NOT<45 within past 12 mos.     Recent Labs   Lab Test 07/26/21 0851 07/09/21 0713   GFRESTIMATED >90 >90   GFRESTBLACK  --  >90       Recent Labs   Lab Test 07/26/21 0851   CR 0.84             Failed - Recent (6 mo) or future (30 days) visit within the authorizing provider's specialty     Patient had office visit in the last 6 months or has a visit in the next 30 days with authorizing provider or within the authorizing provider's specialty.  See \"Patient Info\" tab in inbasket, or \"Choose Columns\" in Meds & Orders section of the refill encounter.            Passed - Patient is age 10 or older        Passed - Patient does NOT have a diagnosis of CHF.        Passed - Medication is active on med list              "

## 2023-06-28 RX ORDER — FLASH GLUCOSE SENSOR
1 KIT MISCELLANEOUS
Qty: 2 EACH | Refills: 11 | Status: SHIPPED | OUTPATIENT
Start: 2023-06-28

## 2023-06-28 RX ORDER — LISINOPRIL 20 MG/1
20 TABLET ORAL DAILY
Qty: 30 TABLET | Refills: 0 | OUTPATIENT
Start: 2023-06-28

## 2024-01-28 ENCOUNTER — HEALTH MAINTENANCE LETTER (OUTPATIENT)
Age: 44
End: 2024-01-28

## 2024-08-25 ENCOUNTER — HEALTH MAINTENANCE LETTER (OUTPATIENT)
Age: 44
End: 2024-08-25

## 2025-02-01 ENCOUNTER — HEALTH MAINTENANCE LETTER (OUTPATIENT)
Age: 45
End: 2025-02-01

## 2025-03-01 ENCOUNTER — HEALTH MAINTENANCE LETTER (OUTPATIENT)
Age: 45
End: 2025-03-01

## 2025-03-07 NOTE — TELEPHONE ENCOUNTER
Please review message below    Thank you  Joellen HARLEY RN     She may proceed with upcoming colonoscopy and/or EGD at moderate risk for perioperative cardiac events.  May hold Plavix for 5 days prior to procedure.